# Patient Record
Sex: FEMALE | Race: WHITE | Employment: OTHER | URBAN - METROPOLITAN AREA
[De-identification: names, ages, dates, MRNs, and addresses within clinical notes are randomized per-mention and may not be internally consistent; named-entity substitution may affect disease eponyms.]

---

## 2017-01-01 ENCOUNTER — APPOINTMENT (OUTPATIENT)
Dept: GENERAL RADIOLOGY | Age: 72
DRG: 246 | End: 2017-01-01
Attending: INTERNAL MEDICINE
Payer: MEDICARE

## 2017-01-01 ENCOUNTER — APPOINTMENT (OUTPATIENT)
Dept: GENERAL RADIOLOGY | Age: 72
DRG: 246 | End: 2017-01-01
Attending: EMERGENCY MEDICINE
Payer: MEDICARE

## 2017-01-01 ENCOUNTER — HOSPITAL ENCOUNTER (INPATIENT)
Age: 72
LOS: 9 days | DRG: 246 | End: 2017-01-12
Attending: EMERGENCY MEDICINE | Admitting: INTERNAL MEDICINE
Payer: MEDICARE

## 2017-01-01 VITALS
BODY MASS INDEX: 13.33 KG/M2 | HEART RATE: 77 BPM | DIASTOLIC BLOOD PRESSURE: 60 MMHG | HEIGHT: 59 IN | OXYGEN SATURATION: 94 % | SYSTOLIC BLOOD PRESSURE: 89 MMHG | RESPIRATION RATE: 18 BRPM | WEIGHT: 66.14 LBS | TEMPERATURE: 97.5 F

## 2017-01-01 DIAGNOSIS — I20.0 UNSTABLE ANGINA (HCC): Primary | ICD-10-CM

## 2017-01-01 LAB
ACT BLD: 178 SECS (ref 79–138)
ACT BLD: 219 SECS (ref 79–138)
ACT BLD: 265 SECS (ref 79–138)
ACT BLD: 286 SECS (ref 79–138)
ALBUMIN SERPL BCP-MCNC: 2.5 G/DL (ref 3.5–5)
ALBUMIN SERPL BCP-MCNC: 3.2 G/DL (ref 3.5–5)
ALBUMIN/GLOB SERPL: 0.8 {RATIO} (ref 1.1–2.2)
ALBUMIN/GLOB SERPL: 1.1 {RATIO} (ref 1.1–2.2)
ALP SERPL-CCNC: 50 U/L (ref 45–117)
ALP SERPL-CCNC: 72 U/L (ref 45–117)
ALT SERPL-CCNC: 31 U/L (ref 12–78)
ALT SERPL-CCNC: 40 U/L (ref 12–78)
ANION GAP BLD CALC-SCNC: 10 MMOL/L (ref 5–15)
ANION GAP BLD CALC-SCNC: 10 MMOL/L (ref 5–15)
ANION GAP BLD CALC-SCNC: 11 MMOL/L (ref 5–15)
ANION GAP BLD CALC-SCNC: 13 MMOL/L (ref 5–15)
ANION GAP BLD CALC-SCNC: 7 MMOL/L (ref 5–15)
ANION GAP BLD CALC-SCNC: 7 MMOL/L (ref 5–15)
ANION GAP BLD CALC-SCNC: 8 MMOL/L (ref 5–15)
ANION GAP BLD CALC-SCNC: 9 MMOL/L (ref 5–15)
ANION GAP BLD CALC-SCNC: 9 MMOL/L (ref 5–15)
APTT PPP: 26.6 SEC (ref 22.1–32.5)
APTT PPP: 29.8 SEC (ref 22.1–32.5)
APTT PPP: 67.5 SEC (ref 22.1–32.5)
ARTERIAL PATENCY WRIST A: ABNORMAL
ARTERIAL PATENCY WRIST A: ABNORMAL
ARTERIAL PATENCY WRIST A: YES
AST SERPL W P-5'-P-CCNC: 51 U/L (ref 15–37)
AST SERPL W P-5'-P-CCNC: 56 U/L (ref 15–37)
ATRIAL RATE: 75 BPM
ATRIAL RATE: 80 BPM
ATRIAL RATE: 82 BPM
ATRIAL RATE: 85 BPM
BACTERIA SPEC CULT: NORMAL
BASE DEFICIT BLDA-SCNC: 1 MMOL/L
BASE DEFICIT BLDA-SCNC: 5.1 MMOL/L
BASE DEFICIT BLDA-SCNC: 5.8 MMOL/L
BASE DEFICIT BLDA-SCNC: 7.3 MMOL/L
BASE EXCESS BLDA CALC-SCNC: 2.5 MMOL/L
BASOPHILS # BLD AUTO: 0 K/UL (ref 0–0.1)
BASOPHILS # BLD AUTO: 0.2 K/UL (ref 0–0.1)
BASOPHILS # BLD: 0 % (ref 0–1)
BASOPHILS # BLD: 1 % (ref 0–1)
BDY SITE: ABNORMAL
BILIRUB SERPL-MCNC: 0.4 MG/DL (ref 0.2–1)
BILIRUB SERPL-MCNC: 0.6 MG/DL (ref 0.2–1)
BREATHS.SPONTANEOUS ON VENT: 20
BUN SERPL-MCNC: 10 MG/DL (ref 6–20)
BUN SERPL-MCNC: 10 MG/DL (ref 6–20)
BUN SERPL-MCNC: 12 MG/DL (ref 6–20)
BUN SERPL-MCNC: 14 MG/DL (ref 6–20)
BUN SERPL-MCNC: 15 MG/DL (ref 6–20)
BUN SERPL-MCNC: 16 MG/DL (ref 6–20)
BUN SERPL-MCNC: 17 MG/DL (ref 6–20)
BUN SERPL-MCNC: 7 MG/DL (ref 6–20)
BUN SERPL-MCNC: 9 MG/DL (ref 6–20)
BUN/CREAT SERPL: 12 (ref 12–20)
BUN/CREAT SERPL: 13 (ref 12–20)
BUN/CREAT SERPL: 14 (ref 12–20)
BUN/CREAT SERPL: 14 (ref 12–20)
BUN/CREAT SERPL: 18 (ref 12–20)
BUN/CREAT SERPL: 20 (ref 12–20)
BUN/CREAT SERPL: 22 (ref 12–20)
BUN/CREAT SERPL: 26 (ref 12–20)
BUN/CREAT SERPL: 27 (ref 12–20)
CALCIUM SERPL-MCNC: 7.8 MG/DL (ref 8.5–10.1)
CALCIUM SERPL-MCNC: 7.9 MG/DL (ref 8.5–10.1)
CALCIUM SERPL-MCNC: 8 MG/DL (ref 8.5–10.1)
CALCIUM SERPL-MCNC: 8.5 MG/DL (ref 8.5–10.1)
CALCIUM SERPL-MCNC: 8.6 MG/DL (ref 8.5–10.1)
CALCIUM SERPL-MCNC: 8.9 MG/DL (ref 8.5–10.1)
CALCIUM SERPL-MCNC: 9.2 MG/DL (ref 8.5–10.1)
CALCULATED P AXIS, ECG09: 78 DEGREES
CALCULATED P AXIS, ECG09: 82 DEGREES
CALCULATED P AXIS, ECG09: 83 DEGREES
CALCULATED P AXIS, ECG09: 86 DEGREES
CALCULATED R AXIS, ECG10: -8 DEGREES
CALCULATED R AXIS, ECG10: 27 DEGREES
CALCULATED R AXIS, ECG10: 4 DEGREES
CALCULATED R AXIS, ECG10: 59 DEGREES
CALCULATED T AXIS, ECG11: -16 DEGREES
CALCULATED T AXIS, ECG11: 75 DEGREES
CALCULATED T AXIS, ECG11: 82 DEGREES
CALCULATED T AXIS, ECG11: 89 DEGREES
CHLORIDE SERPL-SCNC: 104 MMOL/L (ref 97–108)
CHLORIDE SERPL-SCNC: 106 MMOL/L (ref 97–108)
CHLORIDE SERPL-SCNC: 106 MMOL/L (ref 97–108)
CHLORIDE SERPL-SCNC: 107 MMOL/L (ref 97–108)
CHLORIDE SERPL-SCNC: 108 MMOL/L (ref 97–108)
CHLORIDE SERPL-SCNC: 109 MMOL/L (ref 97–108)
CHLORIDE SERPL-SCNC: 109 MMOL/L (ref 97–108)
CHLORIDE SERPL-SCNC: 114 MMOL/L (ref 97–108)
CHLORIDE SERPL-SCNC: 114 MMOL/L (ref 97–108)
CK MB CFR SERPL CALC: 11.6 % (ref 0–2.5)
CK MB CFR SERPL CALC: 4.5 % (ref 0–2.5)
CK MB SERPL-MCNC: 18 NG/ML (ref 5–25)
CK MB SERPL-MCNC: 3.4 NG/ML (ref 5–25)
CK SERPL-CCNC: 155 U/L (ref 26–192)
CK SERPL-CCNC: 75 U/L (ref 26–192)
CO2 SERPL-SCNC: 20 MMOL/L (ref 21–32)
CO2 SERPL-SCNC: 23 MMOL/L (ref 21–32)
CO2 SERPL-SCNC: 24 MMOL/L (ref 21–32)
CO2 SERPL-SCNC: 24 MMOL/L (ref 21–32)
CO2 SERPL-SCNC: 25 MMOL/L (ref 21–32)
CO2 SERPL-SCNC: 26 MMOL/L (ref 21–32)
CO2 SERPL-SCNC: 26 MMOL/L (ref 21–32)
CO2 SERPL-SCNC: 27 MMOL/L (ref 21–32)
CO2 SERPL-SCNC: 27 MMOL/L (ref 21–32)
CREAT SERPL-MCNC: 0.56 MG/DL (ref 0.55–1.02)
CREAT SERPL-MCNC: 0.58 MG/DL (ref 0.55–1.02)
CREAT SERPL-MCNC: 0.65 MG/DL (ref 0.55–1.02)
CREAT SERPL-MCNC: 0.69 MG/DL (ref 0.55–1.02)
CREAT SERPL-MCNC: 0.7 MG/DL (ref 0.55–1.02)
CREAT SERPL-MCNC: 0.72 MG/DL (ref 0.55–1.02)
CREAT SERPL-MCNC: 0.79 MG/DL (ref 0.55–1.02)
DIAGNOSIS, 93000: NORMAL
DIFFERENTIAL METHOD BLD: ABNORMAL
EOSINOPHIL # BLD: 0 K/UL (ref 0–0.4)
EOSINOPHIL # BLD: 0.1 K/UL (ref 0–0.4)
EOSINOPHIL NFR BLD: 0 % (ref 0–7)
EOSINOPHIL NFR BLD: 1 % (ref 0–7)
EPAP/CPAP/PEEP, PAPEEP: 5
ERYTHROCYTE [DISTWIDTH] IN BLOOD BY AUTOMATED COUNT: 13.5 % (ref 11.5–14.5)
ERYTHROCYTE [DISTWIDTH] IN BLOOD BY AUTOMATED COUNT: 13.6 % (ref 11.5–14.5)
ERYTHROCYTE [DISTWIDTH] IN BLOOD BY AUTOMATED COUNT: 13.9 % (ref 11.5–14.5)
ERYTHROCYTE [DISTWIDTH] IN BLOOD BY AUTOMATED COUNT: 14 % (ref 11.5–14.5)
FIO2 ON VENT: 100 %
FIO2 ON VENT: 4 %
FIO2 ON VENT: 40 %
FIO2 ON VENT: 40 %
GAS FLOW.O2 O2 DELIVERY SYS: 5 L/MIN
GAS FLOW.O2 SETTING OXYMISER: 14 L/MIN
GAS FLOW.O2 SETTING OXYMISER: 14 L/MIN
GLOBULIN SER CALC-MCNC: 3 G/DL (ref 2–4)
GLOBULIN SER CALC-MCNC: 3.3 G/DL (ref 2–4)
GLUCOSE BLD STRIP.AUTO-MCNC: 134 MG/DL (ref 65–100)
GLUCOSE BLD STRIP.AUTO-MCNC: 148 MG/DL (ref 65–100)
GLUCOSE BLD STRIP.AUTO-MCNC: 75 MG/DL (ref 65–100)
GLUCOSE SERPL-MCNC: 101 MG/DL (ref 65–100)
GLUCOSE SERPL-MCNC: 105 MG/DL (ref 65–100)
GLUCOSE SERPL-MCNC: 114 MG/DL (ref 65–100)
GLUCOSE SERPL-MCNC: 148 MG/DL (ref 65–100)
GLUCOSE SERPL-MCNC: 71 MG/DL (ref 65–100)
GLUCOSE SERPL-MCNC: 89 MG/DL (ref 65–100)
GLUCOSE SERPL-MCNC: 92 MG/DL (ref 65–100)
GLUCOSE SERPL-MCNC: 93 MG/DL (ref 65–100)
GLUCOSE SERPL-MCNC: 96 MG/DL (ref 65–100)
HCO3 BLDA-SCNC: 18 MMOL/L (ref 22–26)
HCO3 BLDA-SCNC: 20 MMOL/L (ref 22–26)
HCO3 BLDA-SCNC: 20 MMOL/L (ref 22–26)
HCO3 BLDA-SCNC: 22 MMOL/L (ref 22–26)
HCO3 BLDA-SCNC: 27 MMOL/L (ref 22–26)
HCT VFR BLD AUTO: 27.7 % (ref 35–47)
HCT VFR BLD AUTO: 31.6 % (ref 35–47)
HCT VFR BLD AUTO: 34.5 % (ref 35–47)
HCT VFR BLD AUTO: 36.5 % (ref 35–47)
HGB BLD-MCNC: 10.5 G/DL (ref 11.5–16)
HGB BLD-MCNC: 11.5 G/DL (ref 11.5–16)
HGB BLD-MCNC: 12.2 G/DL (ref 11.5–16)
HGB BLD-MCNC: 9.3 G/DL (ref 11.5–16)
LYMPHOCYTES # BLD AUTO: 6 % (ref 12–49)
LYMPHOCYTES # BLD AUTO: 6 % (ref 12–49)
LYMPHOCYTES # BLD: 0.8 K/UL (ref 0.8–3.5)
LYMPHOCYTES # BLD: 0.9 K/UL (ref 0.8–3.5)
MAGNESIUM SERPL-MCNC: 2 MG/DL (ref 1.6–2.4)
MAGNESIUM SERPL-MCNC: 2.1 MG/DL (ref 1.6–2.4)
MAGNESIUM SERPL-MCNC: 2.2 MG/DL (ref 1.6–2.4)
MAGNESIUM SERPL-MCNC: 2.3 MG/DL (ref 1.6–2.4)
MAGNESIUM SERPL-MCNC: 2.3 MG/DL (ref 1.6–2.4)
MCH RBC QN AUTO: 31 PG (ref 26–34)
MCH RBC QN AUTO: 31.3 PG (ref 26–34)
MCH RBC QN AUTO: 31.7 PG (ref 26–34)
MCH RBC QN AUTO: 31.8 PG (ref 26–34)
MCHC RBC AUTO-ENTMCNC: 33.2 G/DL (ref 30–36.5)
MCHC RBC AUTO-ENTMCNC: 33.3 G/DL (ref 30–36.5)
MCHC RBC AUTO-ENTMCNC: 33.4 G/DL (ref 30–36.5)
MCHC RBC AUTO-ENTMCNC: 33.6 G/DL (ref 30–36.5)
MCV RBC AUTO: 92.3 FL (ref 80–99)
MCV RBC AUTO: 94.3 FL (ref 80–99)
MCV RBC AUTO: 95 FL (ref 80–99)
MCV RBC AUTO: 95.1 FL (ref 80–99)
MONOCYTES # BLD: 0.3 K/UL (ref 0–1)
MONOCYTES # BLD: 1 K/UL (ref 0–1)
MONOCYTES NFR BLD AUTO: 2 % (ref 5–13)
MONOCYTES NFR BLD AUTO: 8 % (ref 5–13)
NEUTS BAND NFR BLD MANUAL: 6 % (ref 0–6)
NEUTS SEG # BLD: 10.9 K/UL (ref 1.8–8)
NEUTS SEG # BLD: 14.4 K/UL (ref 1.8–8)
NEUTS SEG NFR BLD AUTO: 85 % (ref 32–75)
NEUTS SEG NFR BLD AUTO: 85 % (ref 32–75)
P-R INTERVAL, ECG05: 116 MS
P-R INTERVAL, ECG05: 124 MS
P-R INTERVAL, ECG05: 134 MS
P-R INTERVAL, ECG05: 164 MS
PCO2 BLDA: 34 MMHG (ref 35–45)
PCO2 BLDA: 34 MMHG (ref 35–45)
PCO2 BLDA: 37 MMHG (ref 35–45)
PCO2 BLDA: 39 MMHG (ref 35–45)
PCO2 BLDA: 41 MMHG (ref 35–45)
PH BLDA: 7.31 [PH] (ref 7.35–7.45)
PH BLDA: 7.33 [PH] (ref 7.35–7.45)
PH BLDA: 7.35 [PH] (ref 7.35–7.45)
PH BLDA: 7.44 [PH] (ref 7.35–7.45)
PH BLDA: 7.45 [PH] (ref 7.35–7.45)
PHOSPHATE SERPL-MCNC: 1.1 MG/DL (ref 2.6–4.7)
PHOSPHATE SERPL-MCNC: 2.1 MG/DL (ref 2.6–4.7)
PHOSPHATE SERPL-MCNC: 3 MG/DL (ref 2.6–4.7)
PHOSPHATE SERPL-MCNC: 3.5 MG/DL (ref 2.6–4.7)
PHOSPHATE SERPL-MCNC: 3.7 MG/DL (ref 2.6–4.7)
PLATELET # BLD AUTO: 179 K/UL (ref 150–400)
PLATELET # BLD AUTO: 255 K/UL (ref 150–400)
PLATELET # BLD AUTO: 280 K/UL (ref 150–400)
PLATELET # BLD AUTO: 401 K/UL (ref 150–400)
PO2 BLDA: 105 MMHG (ref 80–100)
PO2 BLDA: 512 MMHG (ref 80–100)
PO2 BLDA: 55 MMHG (ref 80–100)
PO2 BLDA: 70 MMHG (ref 80–100)
PO2 BLDA: 78 MMHG (ref 80–100)
POTASSIUM SERPL-SCNC: 3 MMOL/L (ref 3.5–5.1)
POTASSIUM SERPL-SCNC: 3.2 MMOL/L (ref 3.5–5.1)
POTASSIUM SERPL-SCNC: 3.4 MMOL/L (ref 3.5–5.1)
POTASSIUM SERPL-SCNC: 3.7 MMOL/L (ref 3.5–5.1)
POTASSIUM SERPL-SCNC: 3.8 MMOL/L (ref 3.5–5.1)
POTASSIUM SERPL-SCNC: 3.9 MMOL/L (ref 3.5–5.1)
POTASSIUM SERPL-SCNC: 4.2 MMOL/L (ref 3.5–5.1)
POTASSIUM SERPL-SCNC: 4.2 MMOL/L (ref 3.5–5.1)
POTASSIUM SERPL-SCNC: 4.4 MMOL/L (ref 3.5–5.1)
PRESSURE SUPPORT SETTING VENT: 8 CM[H2O]
PROT SERPL-MCNC: 5.8 G/DL (ref 6.4–8.2)
PROT SERPL-MCNC: 6.2 G/DL (ref 6.4–8.2)
Q-T INTERVAL, ECG07: 356 MS
Q-T INTERVAL, ECG07: 356 MS
Q-T INTERVAL, ECG07: 366 MS
Q-T INTERVAL, ECG07: 380 MS
QRS DURATION, ECG06: 66 MS
QRS DURATION, ECG06: 68 MS
QRS DURATION, ECG06: 68 MS
QRS DURATION, ECG06: 70 MS
QTC CALCULATION (BEZET), ECG08: 410 MS
QTC CALCULATION (BEZET), ECG08: 423 MS
QTC CALCULATION (BEZET), ECG08: 424 MS
QTC CALCULATION (BEZET), ECG08: 427 MS
RBC # BLD AUTO: 3 M/UL (ref 3.8–5.2)
RBC # BLD AUTO: 3.35 M/UL (ref 3.8–5.2)
RBC # BLD AUTO: 3.63 M/UL (ref 3.8–5.2)
RBC # BLD AUTO: 3.84 M/UL (ref 3.8–5.2)
RBC MORPH BLD: ABNORMAL
SAO2 % BLD: 100 % (ref 92–97)
SAO2 % BLD: 87 % (ref 92–97)
SAO2 % BLD: 95 % (ref 92–97)
SAO2 % BLD: 95 % (ref 92–97)
SAO2 % BLD: 98 % (ref 92–97)
SAO2% DEVICE SAO2% SENSOR NAME: ABNORMAL
SERVICE CMNT-IMP: ABNORMAL
SERVICE CMNT-IMP: ABNORMAL
SERVICE CMNT-IMP: NORMAL
SERVICE CMNT-IMP: NORMAL
SODIUM SERPL-SCNC: 138 MMOL/L (ref 136–145)
SODIUM SERPL-SCNC: 140 MMOL/L (ref 136–145)
SODIUM SERPL-SCNC: 140 MMOL/L (ref 136–145)
SODIUM SERPL-SCNC: 142 MMOL/L (ref 136–145)
SODIUM SERPL-SCNC: 143 MMOL/L (ref 136–145)
SODIUM SERPL-SCNC: 144 MMOL/L (ref 136–145)
SODIUM SERPL-SCNC: 145 MMOL/L (ref 136–145)
SODIUM SERPL-SCNC: 145 MMOL/L (ref 136–145)
SODIUM SERPL-SCNC: 146 MMOL/L (ref 136–145)
SPECIMEN SITE: ABNORMAL
THERAPEUTIC RANGE,PTTT: ABNORMAL SECS (ref 58–77)
THERAPEUTIC RANGE,PTTT: NORMAL SECS (ref 58–77)
THERAPEUTIC RANGE,PTTT: NORMAL SECS (ref 58–77)
TROPONIN I SERPL-MCNC: 0.66 NG/ML
TROPONIN I SERPL-MCNC: 1.39 NG/ML
VENTILATION MODE VENT: ABNORMAL
VENTRICULAR RATE, ECG03: 75 BPM
VENTRICULAR RATE, ECG03: 80 BPM
VENTRICULAR RATE, ECG03: 82 BPM
VENTRICULAR RATE, ECG03: 85 BPM
VT SETTING VENT: 400 ML
VT SETTING VENT: 400 ML
WBC # BLD AUTO: 12.8 K/UL (ref 3.6–11)
WBC # BLD AUTO: 12.9 K/UL (ref 3.6–11)
WBC # BLD AUTO: 13.8 K/UL (ref 3.6–11)
WBC # BLD AUTO: 15.8 K/UL (ref 3.6–11)

## 2017-01-01 PROCEDURE — 36415 COLL VENOUS BLD VENIPUNCTURE: CPT | Performed by: INTERNAL MEDICINE

## 2017-01-01 PROCEDURE — 74011000250 HC RX REV CODE- 250: Performed by: INTERNAL MEDICINE

## 2017-01-01 PROCEDURE — 74011000258 HC RX REV CODE- 258: Performed by: INTERNAL MEDICINE

## 2017-01-01 PROCEDURE — 94761 N-INVAS EAR/PLS OXIMETRY MLT: CPT

## 2017-01-01 PROCEDURE — 74011250636 HC RX REV CODE- 250/636: Performed by: INTERNAL MEDICINE

## 2017-01-01 PROCEDURE — 94640 AIRWAY INHALATION TREATMENT: CPT

## 2017-01-01 PROCEDURE — 74011000250 HC RX REV CODE- 250: Performed by: EMERGENCY MEDICINE

## 2017-01-01 PROCEDURE — 94003 VENT MGMT INPAT SUBQ DAY: CPT

## 2017-01-01 PROCEDURE — 71010 XR CHEST PORT: CPT

## 2017-01-01 PROCEDURE — 80048 BASIC METABOLIC PNL TOTAL CA: CPT | Performed by: INTERNAL MEDICINE

## 2017-01-01 PROCEDURE — 31500 INSERT EMERGENCY AIRWAY: CPT

## 2017-01-01 PROCEDURE — 85025 COMPLETE CBC W/AUTO DIFF WBC: CPT | Performed by: EMERGENCY MEDICINE

## 2017-01-01 PROCEDURE — 74011250637 HC RX REV CODE- 250/637: Performed by: INTERNAL MEDICINE

## 2017-01-01 PROCEDURE — 77030029684 HC NEB SM VOL KT MONA -A

## 2017-01-01 PROCEDURE — 82803 BLOOD GASES ANY COMBINATION: CPT | Performed by: INTERNAL MEDICINE

## 2017-01-01 PROCEDURE — C9113 INJ PANTOPRAZOLE SODIUM, VIA: HCPCS | Performed by: INTERNAL MEDICINE

## 2017-01-01 PROCEDURE — 97161 PT EVAL LOW COMPLEX 20 MIN: CPT

## 2017-01-01 PROCEDURE — 87040 BLOOD CULTURE FOR BACTERIA: CPT | Performed by: INTERNAL MEDICINE

## 2017-01-01 PROCEDURE — 5A1935Z RESPIRATORY VENTILATION, LESS THAN 24 CONSECUTIVE HOURS: ICD-10-PCS | Performed by: ANESTHESIOLOGY

## 2017-01-01 PROCEDURE — 36600 WITHDRAWAL OF ARTERIAL BLOOD: CPT

## 2017-01-01 PROCEDURE — 77010033678 HC OXYGEN DAILY

## 2017-01-01 PROCEDURE — 65660000000 HC RM CCU STEPDOWN

## 2017-01-01 PROCEDURE — C1769 GUIDE WIRE: HCPCS

## 2017-01-01 PROCEDURE — C1725 CATH, TRANSLUMIN NON-LASER: HCPCS

## 2017-01-01 PROCEDURE — 97530 THERAPEUTIC ACTIVITIES: CPT

## 2017-01-01 PROCEDURE — 84100 ASSAY OF PHOSPHORUS: CPT | Performed by: INTERNAL MEDICINE

## 2017-01-01 PROCEDURE — 65620000000 HC RM CCU GENERAL

## 2017-01-01 PROCEDURE — 74011000250 HC RX REV CODE- 250

## 2017-01-01 PROCEDURE — 99285 EMERGENCY DEPT VISIT HI MDM: CPT

## 2017-01-01 PROCEDURE — 36600 WITHDRAWAL OF ARTERIAL BLOOD: CPT | Performed by: INTERNAL MEDICINE

## 2017-01-01 PROCEDURE — 027036Z DILATION OF CORONARY ARTERY, ONE ARTERY WITH THREE DRUG-ELUTING INTRALUMINAL DEVICES, PERCUTANEOUS APPROACH: ICD-10-PCS | Performed by: INTERNAL MEDICINE

## 2017-01-01 PROCEDURE — 97166 OT EVAL MOD COMPLEX 45 MIN: CPT

## 2017-01-01 PROCEDURE — 85027 COMPLETE CBC AUTOMATED: CPT | Performed by: INTERNAL MEDICINE

## 2017-01-01 PROCEDURE — C1894 INTRO/SHEATH, NON-LASER: HCPCS

## 2017-01-01 PROCEDURE — 77030002996 HC SUT SLK J&J -A

## 2017-01-01 PROCEDURE — 77030013140 HC MSK NEB VYRM -A

## 2017-01-01 PROCEDURE — 0BH17EZ INSERTION OF ENDOTRACHEAL AIRWAY INTO TRACHEA, VIA NATURAL OR ARTIFICIAL OPENING: ICD-10-PCS | Performed by: ANESTHESIOLOGY

## 2017-01-01 PROCEDURE — 74011636320 HC RX REV CODE- 636/320

## 2017-01-01 PROCEDURE — 74011250636 HC RX REV CODE- 250/636: Performed by: EMERGENCY MEDICINE

## 2017-01-01 PROCEDURE — 71020 XR CHEST PA LAT: CPT

## 2017-01-01 PROCEDURE — 83735 ASSAY OF MAGNESIUM: CPT | Performed by: INTERNAL MEDICINE

## 2017-01-01 PROCEDURE — 82962 GLUCOSE BLOOD TEST: CPT

## 2017-01-01 PROCEDURE — 76060000036 HC ANESTHESIA 2.5 TO 3 HR

## 2017-01-01 PROCEDURE — 02C03ZZ EXTIRPATION OF MATTER FROM CORONARY ARTERY, ONE ARTERY, PERCUTANEOUS APPROACH: ICD-10-PCS | Performed by: INTERNAL MEDICINE

## 2017-01-01 PROCEDURE — C1874 STENT, COATED/COV W/DEL SYS: HCPCS

## 2017-01-01 PROCEDURE — 97535 SELF CARE MNGMENT TRAINING: CPT

## 2017-01-01 PROCEDURE — G8978 MOBILITY CURRENT STATUS: HCPCS

## 2017-01-01 PROCEDURE — 77030034850

## 2017-01-01 PROCEDURE — 77030019697 HC SYR ANGI INFL MRTM -B

## 2017-01-01 PROCEDURE — C1887 CATHETER, GUIDING: HCPCS

## 2017-01-01 PROCEDURE — 74011250636 HC RX REV CODE- 250/636

## 2017-01-01 PROCEDURE — 85730 THROMBOPLASTIN TIME PARTIAL: CPT | Performed by: EMERGENCY MEDICINE

## 2017-01-01 PROCEDURE — B2151ZZ FLUOROSCOPY OF LEFT HEART USING LOW OSMOLAR CONTRAST: ICD-10-PCS | Performed by: INTERNAL MEDICINE

## 2017-01-01 PROCEDURE — 85347 COAGULATION TIME ACTIVATED: CPT

## 2017-01-01 PROCEDURE — 5A1223Z PERFORMANCE OF CARDIAC PACING, CONTINUOUS: ICD-10-PCS | Performed by: INTERNAL MEDICINE

## 2017-01-01 PROCEDURE — 82550 ASSAY OF CK (CPK): CPT | Performed by: EMERGENCY MEDICINE

## 2017-01-01 PROCEDURE — 93005 ELECTROCARDIOGRAM TRACING: CPT

## 2017-01-01 PROCEDURE — 74011636320 HC RX REV CODE- 636/320: Performed by: INTERNAL MEDICINE

## 2017-01-01 PROCEDURE — G8979 MOBILITY GOAL STATUS: HCPCS

## 2017-01-01 PROCEDURE — C1724 CATH, TRANS ATHEREC,ROTATION: HCPCS

## 2017-01-01 PROCEDURE — 77030018729 HC ELECTRD DEFIB PAD CARD -B

## 2017-01-01 PROCEDURE — 80053 COMPREHEN METABOLIC PANEL: CPT | Performed by: INTERNAL MEDICINE

## 2017-01-01 PROCEDURE — 80053 COMPREHEN METABOLIC PANEL: CPT | Performed by: EMERGENCY MEDICINE

## 2017-01-01 PROCEDURE — 97116 GAIT TRAINING THERAPY: CPT

## 2017-01-01 PROCEDURE — 82550 ASSAY OF CK (CPK): CPT | Performed by: INTERNAL MEDICINE

## 2017-01-01 PROCEDURE — 93458 L HRT ARTERY/VENTRICLE ANGIO: CPT

## 2017-01-01 PROCEDURE — 5A12012 PERFORMANCE OF CARDIAC OUTPUT, SINGLE, MANUAL: ICD-10-PCS | Performed by: ANESTHESIOLOGY

## 2017-01-01 PROCEDURE — 85025 COMPLETE CBC W/AUTO DIFF WBC: CPT | Performed by: INTERNAL MEDICINE

## 2017-01-01 PROCEDURE — 84484 ASSAY OF TROPONIN QUANT: CPT | Performed by: EMERGENCY MEDICINE

## 2017-01-01 PROCEDURE — 77030022472 HC CATH ANGI GDLNR VASC -E

## 2017-01-01 PROCEDURE — 77030005320 HC CATH PACE TEMP STJU -B

## 2017-01-01 PROCEDURE — 77030022017 HC DRSG HEMO QCLOT ZMED -A

## 2017-01-01 PROCEDURE — G8988 SELF CARE GOAL STATUS: HCPCS

## 2017-01-01 PROCEDURE — 94002 VENT MGMT INPAT INIT DAY: CPT

## 2017-01-01 PROCEDURE — G8987 SELF CARE CURRENT STATUS: HCPCS

## 2017-01-01 PROCEDURE — 36415 COLL VENOUS BLD VENIPUNCTURE: CPT | Performed by: EMERGENCY MEDICINE

## 2017-01-01 PROCEDURE — 4A023N7 MEASUREMENT OF CARDIAC SAMPLING AND PRESSURE, LEFT HEART, PERCUTANEOUS APPROACH: ICD-10-PCS | Performed by: INTERNAL MEDICINE

## 2017-01-01 PROCEDURE — 85730 THROMBOPLASTIN TIME PARTIAL: CPT | Performed by: INTERNAL MEDICINE

## 2017-01-01 PROCEDURE — 77030013797 HC KT TRNSDUC PRSSR EDWD -A

## 2017-01-01 PROCEDURE — B2111ZZ FLUOROSCOPY OF MULTIPLE CORONARY ARTERIES USING LOW OSMOLAR CONTRAST: ICD-10-PCS | Performed by: INTERNAL MEDICINE

## 2017-01-01 PROCEDURE — 84484 ASSAY OF TROPONIN QUANT: CPT | Performed by: INTERNAL MEDICINE

## 2017-01-01 PROCEDURE — 83735 ASSAY OF MAGNESIUM: CPT | Performed by: EMERGENCY MEDICINE

## 2017-01-01 RX ORDER — POTASSIUM CHLORIDE 1.5 G/1.77G
40 POWDER, FOR SOLUTION ORAL ONCE
Status: COMPLETED | OUTPATIENT
Start: 2017-01-01 | End: 2017-01-01

## 2017-01-01 RX ORDER — ALPRAZOLAM 0.5 MG/1
1 TABLET ORAL
Status: DISCONTINUED | OUTPATIENT
Start: 2017-01-01 | End: 2017-01-01

## 2017-01-01 RX ORDER — CLOPIDOGREL 300 MG/1
600 TABLET, FILM COATED ORAL ONCE
Status: DISCONTINUED | OUTPATIENT
Start: 2017-01-01 | End: 2017-01-01 | Stop reason: HOSPADM

## 2017-01-01 RX ORDER — ACETAMINOPHEN 500 MG
1000 TABLET ORAL
Status: ACTIVE | OUTPATIENT
Start: 2017-01-01 | End: 2017-01-01

## 2017-01-01 RX ORDER — HEPARIN SODIUM 200 [USP'U]/100ML
500 INJECTION, SOLUTION INTRAVENOUS ONCE
Status: COMPLETED | OUTPATIENT
Start: 2017-01-01 | End: 2017-01-01

## 2017-01-01 RX ORDER — IPRATROPIUM BROMIDE AND ALBUTEROL SULFATE 2.5; .5 MG/3ML; MG/3ML
3 SOLUTION RESPIRATORY (INHALATION)
Status: DISCONTINUED | OUTPATIENT
Start: 2017-01-01 | End: 2017-01-01 | Stop reason: HOSPADM

## 2017-01-01 RX ORDER — PANTOPRAZOLE SODIUM 40 MG/1
40 TABLET, DELAYED RELEASE ORAL
Status: DISCONTINUED | OUTPATIENT
Start: 2017-01-01 | End: 2017-01-01 | Stop reason: HOSPADM

## 2017-01-01 RX ORDER — HEPARIN SODIUM 10000 [USP'U]/100ML
12-25 INJECTION, SOLUTION INTRAVENOUS
Status: DISCONTINUED | OUTPATIENT
Start: 2017-01-01 | End: 2017-01-01

## 2017-01-01 RX ORDER — SODIUM CHLORIDE 0.9 % (FLUSH) 0.9 %
5-10 SYRINGE (ML) INJECTION AS NEEDED
Status: DISCONTINUED | OUTPATIENT
Start: 2017-01-01 | End: 2017-01-01 | Stop reason: HOSPADM

## 2017-01-01 RX ORDER — PHENYLEPHRINE HYDROCHLORIDE 10 MG/ML
INJECTION INTRAVENOUS
Status: DISCONTINUED
Start: 2017-01-01 | End: 2017-01-01 | Stop reason: ALTCHOICE

## 2017-01-01 RX ORDER — VERAPAMIL HYDROCHLORIDE 2.5 MG/ML
INJECTION, SOLUTION INTRAVENOUS
Status: DISCONTINUED
Start: 2017-01-01 | End: 2017-01-01 | Stop reason: ALTCHOICE

## 2017-01-01 RX ORDER — METOPROLOL TARTRATE 25 MG/1
12.5 TABLET, FILM COATED ORAL EVERY 12 HOURS
Status: DISCONTINUED | OUTPATIENT
Start: 2017-01-01 | End: 2017-01-01 | Stop reason: HOSPADM

## 2017-01-01 RX ORDER — POTASSIUM CHLORIDE 20 MEQ/1
40 TABLET, EXTENDED RELEASE ORAL
Status: COMPLETED | OUTPATIENT
Start: 2017-01-01 | End: 2017-01-01

## 2017-01-01 RX ORDER — ACETAMINOPHEN 10 MG/ML
1000 INJECTION, SOLUTION INTRAVENOUS
Status: DISCONTINUED | OUTPATIENT
Start: 2017-01-01 | End: 2017-01-01

## 2017-01-01 RX ORDER — CLOPIDOGREL 300 MG/1
600 TABLET, FILM COATED ORAL ONCE
Status: COMPLETED | OUTPATIENT
Start: 2017-01-01 | End: 2017-01-01

## 2017-01-01 RX ORDER — IPRATROPIUM BROMIDE 0.5 MG/2.5ML
0.5 SOLUTION RESPIRATORY (INHALATION)
Status: DISCONTINUED | OUTPATIENT
Start: 2017-01-01 | End: 2017-01-01

## 2017-01-01 RX ORDER — ALBUTEROL SULFATE 0.83 MG/ML
2.5 SOLUTION RESPIRATORY (INHALATION)
Status: DISCONTINUED | OUTPATIENT
Start: 2017-01-01 | End: 2017-01-01

## 2017-01-01 RX ORDER — LORAZEPAM 1 MG/1
1 TABLET ORAL
Status: DISCONTINUED | OUTPATIENT
Start: 2017-01-01 | End: 2017-01-01

## 2017-01-01 RX ORDER — HEPARIN SODIUM 5000 [USP'U]/ML
60 INJECTION, SOLUTION INTRAVENOUS; SUBCUTANEOUS AS NEEDED
Status: DISCONTINUED | OUTPATIENT
Start: 2017-01-01 | End: 2017-01-01 | Stop reason: ALTCHOICE

## 2017-01-01 RX ORDER — PRAVASTATIN SODIUM 40 MG/1
40 TABLET ORAL
Status: DISCONTINUED | OUTPATIENT
Start: 2017-01-01 | End: 2017-01-01

## 2017-01-01 RX ORDER — NITROGLYCERIN 0.4 MG/1
0.4 TABLET SUBLINGUAL AS NEEDED
Status: DISCONTINUED | OUTPATIENT
Start: 2017-01-01 | End: 2017-01-01 | Stop reason: HOSPADM

## 2017-01-01 RX ORDER — SODIUM CHLORIDE 9 MG/ML
INJECTION, SOLUTION INTRAVENOUS
Status: DISCONTINUED
Start: 2017-01-01 | End: 2017-01-01 | Stop reason: ALTCHOICE

## 2017-01-01 RX ORDER — ATROPINE SULFATE 0.1 MG/ML
INJECTION INTRAVENOUS
Status: COMPLETED | OUTPATIENT
Start: 2017-01-01 | End: 2017-01-01

## 2017-01-01 RX ORDER — HEPARIN SODIUM 200 [USP'U]/100ML
INJECTION, SOLUTION INTRAVENOUS
Status: DISCONTINUED
Start: 2017-01-01 | End: 2017-01-01 | Stop reason: HOSPADM

## 2017-01-01 RX ORDER — CLOPIDOGREL BISULFATE 75 MG/1
75 TABLET ORAL DAILY
Status: DISCONTINUED | OUTPATIENT
Start: 2017-01-01 | End: 2017-01-01 | Stop reason: HOSPADM

## 2017-01-01 RX ORDER — PROPOFOL 10 MG/ML
5-50 VIAL (ML) INTRAVENOUS
Status: DISCONTINUED | OUTPATIENT
Start: 2017-01-01 | End: 2017-01-01

## 2017-01-01 RX ORDER — POTASSIUM CHLORIDE 750 MG/1
20 TABLET, FILM COATED, EXTENDED RELEASE ORAL DAILY
Status: DISCONTINUED | OUTPATIENT
Start: 2017-01-01 | End: 2017-01-01 | Stop reason: HOSPADM

## 2017-01-01 RX ORDER — IPRATROPIUM BROMIDE AND ALBUTEROL SULFATE 2.5; .5 MG/3ML; MG/3ML
3 SOLUTION RESPIRATORY (INHALATION)
Status: DISCONTINUED | OUTPATIENT
Start: 2017-01-01 | End: 2017-01-01

## 2017-01-01 RX ORDER — ACETAMINOPHEN 10 MG/ML
1000 INJECTION, SOLUTION INTRAVENOUS
Status: DISPENSED | OUTPATIENT
Start: 2017-01-01 | End: 2017-01-01

## 2017-01-01 RX ORDER — LIDOCAINE HYDROCHLORIDE 10 MG/ML
INJECTION INFILTRATION; PERINEURAL
Status: COMPLETED
Start: 2017-01-01 | End: 2017-01-01

## 2017-01-01 RX ORDER — ALBUTEROL SULFATE 90 UG/1
2 AEROSOL, METERED RESPIRATORY (INHALATION)
Status: DISCONTINUED | OUTPATIENT
Start: 2017-01-01 | End: 2017-01-01 | Stop reason: SDUPTHER

## 2017-01-01 RX ORDER — LEVOTHYROXINE SODIUM 50 UG/1
50 TABLET ORAL
COMMUNITY

## 2017-01-01 RX ORDER — HEPARIN SODIUM 1000 [USP'U]/ML
INJECTION, SOLUTION INTRAVENOUS; SUBCUTANEOUS
Status: COMPLETED
Start: 2017-01-01 | End: 2017-01-01

## 2017-01-01 RX ORDER — ALBUTEROL SULFATE 0.83 MG/ML
5 SOLUTION RESPIRATORY (INHALATION)
Status: COMPLETED | OUTPATIENT
Start: 2017-01-01 | End: 2017-01-01

## 2017-01-01 RX ORDER — IPRATROPIUM BROMIDE 0.5 MG/2.5ML
0.5 SOLUTION RESPIRATORY (INHALATION)
Status: COMPLETED | OUTPATIENT
Start: 2017-01-01 | End: 2017-01-01

## 2017-01-01 RX ORDER — CHLORHEXIDINE GLUCONATE 1.2 MG/ML
15 RINSE ORAL EVERY 12 HOURS
Status: DISCONTINUED | OUTPATIENT
Start: 2017-01-01 | End: 2017-01-01

## 2017-01-01 RX ORDER — HEPARIN SODIUM 5000 [USP'U]/ML
INJECTION, SOLUTION INTRAVENOUS; SUBCUTANEOUS
Status: DISCONTINUED
Start: 2017-01-01 | End: 2017-01-01 | Stop reason: HOSPADM

## 2017-01-01 RX ORDER — FLUTICASONE FUROATE AND VILANTEROL 100; 25 UG/1; UG/1
1 POWDER RESPIRATORY (INHALATION) DAILY
Status: DISCONTINUED | OUTPATIENT
Start: 2017-01-01 | End: 2017-01-01 | Stop reason: HOSPADM

## 2017-01-01 RX ORDER — GUAIFENESIN 100 MG/5ML
81 LIQUID (ML) ORAL DAILY
Status: DISCONTINUED | OUTPATIENT
Start: 2017-01-01 | End: 2017-01-01 | Stop reason: HOSPADM

## 2017-01-01 RX ORDER — LEVOTHYROXINE SODIUM 75 UG/1
75 TABLET ORAL DAILY
Status: DISCONTINUED | OUTPATIENT
Start: 2017-01-01 | End: 2017-01-01 | Stop reason: SDUPTHER

## 2017-01-01 RX ORDER — FLUTICASONE PROPIONATE 50 MCG
2 SPRAY, SUSPENSION (ML) NASAL DAILY
Status: DISCONTINUED | OUTPATIENT
Start: 2017-01-01 | End: 2017-01-01 | Stop reason: HOSPADM

## 2017-01-01 RX ORDER — HEPARIN SODIUM 200 [USP'U]/100ML
INJECTION, SOLUTION INTRAVENOUS
Status: COMPLETED
Start: 2017-01-01 | End: 2017-01-01

## 2017-01-01 RX ORDER — LEVOTHYROXINE SODIUM 50 UG/1
50 TABLET ORAL
Status: DISCONTINUED | OUTPATIENT
Start: 2017-01-01 | End: 2017-01-01 | Stop reason: HOSPADM

## 2017-01-01 RX ORDER — LIDOCAINE HYDROCHLORIDE 10 MG/ML
INJECTION INFILTRATION; PERINEURAL
Status: DISCONTINUED
Start: 2017-01-01 | End: 2017-01-01 | Stop reason: HOSPADM

## 2017-01-01 RX ORDER — MIDAZOLAM HYDROCHLORIDE 1 MG/ML
INJECTION, SOLUTION INTRAMUSCULAR; INTRAVENOUS
Status: COMPLETED
Start: 2017-01-01 | End: 2017-01-01

## 2017-01-01 RX ORDER — VARENICLINE TARTRATE 0.5 MG/1
0.5 TABLET, FILM COATED ORAL
COMMUNITY

## 2017-01-01 RX ORDER — PHENYLEPHRINE HYDROCHLORIDE 10 MG/ML
100-200 INJECTION INTRAVENOUS
Status: DISCONTINUED | OUTPATIENT
Start: 2017-01-01 | End: 2017-01-01 | Stop reason: ALTCHOICE

## 2017-01-01 RX ORDER — ALPRAZOLAM 0.25 MG/1
0.25 TABLET ORAL
Status: DISCONTINUED | OUTPATIENT
Start: 2017-01-01 | End: 2017-01-01 | Stop reason: HOSPADM

## 2017-01-01 RX ORDER — FENTANYL CITRATE 50 UG/ML
INJECTION, SOLUTION INTRAMUSCULAR; INTRAVENOUS
Status: COMPLETED
Start: 2017-01-01 | End: 2017-01-01

## 2017-01-01 RX ORDER — AMOXICILLIN 250 MG
1 CAPSULE ORAL DAILY PRN
Status: DISCONTINUED | OUTPATIENT
Start: 2017-01-01 | End: 2017-01-01 | Stop reason: HOSPADM

## 2017-01-01 RX ORDER — AMOXICILLIN AND CLAVULANATE POTASSIUM 875; 125 MG/1; MG/1
1 TABLET, FILM COATED ORAL EVERY 12 HOURS
Status: DISCONTINUED | OUTPATIENT
Start: 2017-01-01 | End: 2017-01-01

## 2017-01-01 RX ORDER — SODIUM CHLORIDE 9 MG/ML
100 INJECTION, SOLUTION INTRAVENOUS CONTINUOUS
Status: DISCONTINUED | OUTPATIENT
Start: 2017-01-01 | End: 2017-01-01

## 2017-01-01 RX ORDER — HEPARIN SODIUM 5000 [USP'U]/ML
30 INJECTION, SOLUTION INTRAVENOUS; SUBCUTANEOUS AS NEEDED
Status: DISCONTINUED | OUTPATIENT
Start: 2017-01-01 | End: 2017-01-01 | Stop reason: ALTCHOICE

## 2017-01-01 RX ORDER — VALSARTAN 40 MG/1
20 TABLET ORAL DAILY
Status: DISCONTINUED | OUTPATIENT
Start: 2017-01-01 | End: 2017-01-01 | Stop reason: HOSPADM

## 2017-01-01 RX ORDER — ASPIRIN 81 MG/1
81 TABLET ORAL DAILY
Status: DISCONTINUED | OUTPATIENT
Start: 2017-01-01 | End: 2017-01-01

## 2017-01-01 RX ORDER — HEPARIN SODIUM 5000 [USP'U]/ML
5000 INJECTION, SOLUTION INTRAVENOUS; SUBCUTANEOUS ONCE
Status: COMPLETED | OUTPATIENT
Start: 2017-01-01 | End: 2017-01-01

## 2017-01-01 RX ORDER — AMOXICILLIN AND CLAVULANATE POTASSIUM 500; 125 MG/1; MG/1
1 TABLET, FILM COATED ORAL EVERY 12 HOURS
Status: DISCONTINUED | OUTPATIENT
Start: 2017-01-01 | End: 2017-01-01 | Stop reason: HOSPADM

## 2017-01-01 RX ORDER — LEVOTHYROXINE SODIUM 75 UG/1
75 TABLET ORAL
Status: DISCONTINUED | OUTPATIENT
Start: 2017-01-01 | End: 2017-01-01 | Stop reason: HOSPADM

## 2017-01-01 RX ORDER — LISINOPRIL 5 MG/1
5 TABLET ORAL DAILY
Status: DISCONTINUED | OUTPATIENT
Start: 2017-01-01 | End: 2017-01-01

## 2017-01-01 RX ORDER — GUAIFENESIN 100 MG/5ML
100 SOLUTION ORAL 4 TIMES DAILY
Status: DISCONTINUED | OUTPATIENT
Start: 2017-01-01 | End: 2017-01-01 | Stop reason: HOSPADM

## 2017-01-01 RX ORDER — SUCCINYLCHOLINE CHLORIDE 20 MG/ML
INJECTION INTRAMUSCULAR; INTRAVENOUS
Status: DISCONTINUED
Start: 2017-01-01 | End: 2017-01-01 | Stop reason: ALTCHOICE

## 2017-01-01 RX ORDER — TRIAMCINOLONE ACETONIDE 55 UG/1
2 SPRAY, METERED NASAL DAILY
Status: DISCONTINUED | OUTPATIENT
Start: 2017-01-01 | End: 2017-01-01 | Stop reason: CLARIF

## 2017-01-01 RX ORDER — FENTANYL CITRATE 50 UG/ML
25-50 INJECTION, SOLUTION INTRAMUSCULAR; INTRAVENOUS
Status: DISCONTINUED | OUTPATIENT
Start: 2017-01-01 | End: 2017-01-01 | Stop reason: ALTCHOICE

## 2017-01-01 RX ORDER — MIDAZOLAM HYDROCHLORIDE 1 MG/ML
.5-2 INJECTION, SOLUTION INTRAMUSCULAR; INTRAVENOUS
Status: DISCONTINUED | OUTPATIENT
Start: 2017-01-01 | End: 2017-01-01 | Stop reason: ALTCHOICE

## 2017-01-01 RX ORDER — LORAZEPAM 2 MG/ML
1 INJECTION INTRAMUSCULAR ONCE
Status: DISCONTINUED | OUTPATIENT
Start: 2017-01-01 | End: 2017-01-01 | Stop reason: HOSPADM

## 2017-01-01 RX ORDER — LEVOTHYROXINE SODIUM 75 UG/1
75 TABLET ORAL
COMMUNITY

## 2017-01-01 RX ORDER — SODIUM CHLORIDE 0.9 % (FLUSH) 0.9 %
5-10 SYRINGE (ML) INJECTION EVERY 8 HOURS
Status: DISCONTINUED | OUTPATIENT
Start: 2017-01-01 | End: 2017-01-01 | Stop reason: HOSPADM

## 2017-01-01 RX ORDER — HEPARIN SODIUM 1000 [USP'U]/ML
1000-10000 INJECTION, SOLUTION INTRAVENOUS; SUBCUTANEOUS
Status: DISCONTINUED | OUTPATIENT
Start: 2017-01-01 | End: 2017-01-01 | Stop reason: ALTCHOICE

## 2017-01-01 RX ORDER — LIDOCAINE HYDROCHLORIDE 10 MG/ML
1-20 INJECTION INFILTRATION; PERINEURAL
Status: DISCONTINUED | OUTPATIENT
Start: 2017-01-01 | End: 2017-01-01 | Stop reason: ALTCHOICE

## 2017-01-01 RX ORDER — BUMETANIDE 0.25 MG/ML
1 INJECTION INTRAMUSCULAR; INTRAVENOUS ONCE
Status: COMPLETED | OUTPATIENT
Start: 2017-01-01 | End: 2017-01-01

## 2017-01-01 RX ORDER — SODIUM CHLORIDE 9 MG/ML
50 INJECTION, SOLUTION INTRAVENOUS CONTINUOUS
Status: DISCONTINUED | OUTPATIENT
Start: 2017-01-01 | End: 2017-01-01

## 2017-01-01 RX ORDER — SODIUM BICARBONATE 1 MEQ/ML
SYRINGE (ML) INTRAVENOUS
Status: COMPLETED | OUTPATIENT
Start: 2017-01-01 | End: 2017-01-01

## 2017-01-01 RX ORDER — METOPROLOL TARTRATE 25 MG/1
25 TABLET, FILM COATED ORAL EVERY 12 HOURS
Status: DISCONTINUED | OUTPATIENT
Start: 2017-01-01 | End: 2017-01-01

## 2017-01-01 RX ORDER — HEPARIN SODIUM 1000 [USP'U]/ML
INJECTION, SOLUTION INTRAVENOUS; SUBCUTANEOUS
Status: DISCONTINUED
Start: 2017-01-01 | End: 2017-01-01 | Stop reason: ALTCHOICE

## 2017-01-01 RX ORDER — DOPAMINE HYDROCHLORIDE 320 MG/100ML
INJECTION, SOLUTION INTRAVENOUS
Status: COMPLETED | OUTPATIENT
Start: 2017-01-01 | End: 2017-01-01

## 2017-01-01 RX ORDER — ATORVASTATIN CALCIUM 40 MG/1
40 TABLET, FILM COATED ORAL
Status: DISCONTINUED | OUTPATIENT
Start: 2017-01-01 | End: 2017-01-01

## 2017-01-01 RX ORDER — ACETAMINOPHEN 325 MG/1
650 TABLET ORAL
Status: DISCONTINUED | OUTPATIENT
Start: 2017-01-01 | End: 2017-01-01 | Stop reason: HOSPADM

## 2017-01-01 RX ORDER — EPTIFIBATIDE 0.75 MG/ML
1 INJECTION, SOLUTION INTRAVENOUS CONTINUOUS
Status: DISCONTINUED | OUTPATIENT
Start: 2017-01-01 | End: 2017-01-01

## 2017-01-01 RX ORDER — PRAVASTATIN SODIUM 40 MG/1
40 TABLET ORAL
COMMUNITY

## 2017-01-01 RX ORDER — HEPARIN SODIUM 1000 [USP'U]/ML
INJECTION, SOLUTION INTRAVENOUS; SUBCUTANEOUS
Status: DISCONTINUED
Start: 2017-01-01 | End: 2017-01-01 | Stop reason: HOSPADM

## 2017-01-01 RX ORDER — EPINEPHRINE 0.1 MG/ML
INJECTION INTRACARDIAC; INTRAVENOUS
Status: COMPLETED | OUTPATIENT
Start: 2017-01-01 | End: 2017-01-01

## 2017-01-01 RX ORDER — ATORVASTATIN CALCIUM 40 MG/1
40 TABLET, FILM COATED ORAL
Status: DISCONTINUED | OUTPATIENT
Start: 2017-01-01 | End: 2017-01-01 | Stop reason: HOSPADM

## 2017-01-01 RX ORDER — EPTIFIBATIDE 0.75 MG/ML
INJECTION, SOLUTION INTRAVENOUS
Status: COMPLETED
Start: 2017-01-01 | End: 2017-01-01

## 2017-01-01 RX ORDER — MUPIROCIN 20 MG/G
OINTMENT TOPICAL 2 TIMES DAILY
Status: COMPLETED | OUTPATIENT
Start: 2017-01-01 | End: 2017-01-01

## 2017-01-01 RX ADMIN — IPRATROPIUM BROMIDE AND ALBUTEROL SULFATE 3 ML: .5; 3 SOLUTION RESPIRATORY (INHALATION) at 21:58

## 2017-01-01 RX ADMIN — LEVOTHYROXINE SODIUM 75 MCG: 75 TABLET ORAL at 06:14

## 2017-01-01 RX ADMIN — UMECLIDINIUM 1 PUFF: 62.5 AEROSOL, POWDER ORAL at 18:09

## 2017-01-01 RX ADMIN — IPRATROPIUM BROMIDE AND ALBUTEROL SULFATE 3 ML: .5; 3 SOLUTION RESPIRATORY (INHALATION) at 23:08

## 2017-01-01 RX ADMIN — HEPARIN SODIUM 2000 UNITS: 1000 INJECTION, SOLUTION INTRAVENOUS; SUBCUTANEOUS at 17:38

## 2017-01-01 RX ADMIN — METOPROLOL TARTRATE 12.5 MG: 25 TABLET ORAL at 06:25

## 2017-01-01 RX ADMIN — GUAIFENESIN 100 MG: 100 SOLUTION ORAL at 13:28

## 2017-01-01 RX ADMIN — CLOPIDOGREL BISULFATE 75 MG: 75 TABLET ORAL at 09:22

## 2017-01-01 RX ADMIN — AMOXICILLIN AND CLAVULANATE POTASSIUM 1 TABLET: 500; 125 TABLET, FILM COATED ORAL at 09:00

## 2017-01-01 RX ADMIN — NITROGLYCERIN 1 INCH: 20 OINTMENT TOPICAL at 13:14

## 2017-01-01 RX ADMIN — HEPARIN SODIUM 1000 UNITS: 200 INJECTION, SOLUTION INTRAVENOUS at 15:17

## 2017-01-01 RX ADMIN — POTASSIUM CHLORIDE 20 MEQ: 750 TABLET, FILM COATED, EXTENDED RELEASE ORAL at 09:59

## 2017-01-01 RX ADMIN — SODIUM CHLORIDE 50 ML/HR: 900 INJECTION, SOLUTION INTRAVENOUS at 12:18

## 2017-01-01 RX ADMIN — ASPIRIN 81 MG 81 MG: 81 TABLET ORAL at 10:18

## 2017-01-01 RX ADMIN — Medication 10 ML: at 05:38

## 2017-01-01 RX ADMIN — IOPAMIDOL 70 ML: 755 INJECTION, SOLUTION INTRAVENOUS at 17:43

## 2017-01-01 RX ADMIN — METOPROLOL TARTRATE 12.5 MG: 25 TABLET ORAL at 18:08

## 2017-01-01 RX ADMIN — IPRATROPIUM BROMIDE AND ALBUTEROL SULFATE 3 ML: .5; 3 SOLUTION RESPIRATORY (INHALATION) at 02:57

## 2017-01-01 RX ADMIN — PANTOPRAZOLE SODIUM 40 MG: 40 TABLET, DELAYED RELEASE ORAL at 08:36

## 2017-01-01 RX ADMIN — LEVOTHYROXINE SODIUM 75 MCG: 75 TABLET ORAL at 09:19

## 2017-01-01 RX ADMIN — GUAIFENESIN 100 MG: 100 SOLUTION ORAL at 23:04

## 2017-01-01 RX ADMIN — GUAIFENESIN 100 MG: 100 SOLUTION ORAL at 17:55

## 2017-01-01 RX ADMIN — DOCUSATE SODIUM AND SENNOSIDES 1 TABLET: 8.6; 5 TABLET, FILM COATED ORAL at 09:51

## 2017-01-01 RX ADMIN — Medication 10 ML: at 13:49

## 2017-01-01 RX ADMIN — PIPERACILLIN SODIUM,TAZOBACTAM SODIUM 2.25 G: 2; .25 INJECTION, POWDER, FOR SOLUTION INTRAVENOUS at 14:48

## 2017-01-01 RX ADMIN — HEPARIN SODIUM 5000 UNITS: 5000 INJECTION, SOLUTION INTRAVENOUS; SUBCUTANEOUS at 06:01

## 2017-01-01 RX ADMIN — VALSARTAN 20 MG: 40 TABLET, FILM COATED ORAL at 13:28

## 2017-01-01 RX ADMIN — Medication 10 ML: at 21:07

## 2017-01-01 RX ADMIN — PIPERACILLIN SODIUM,TAZOBACTAM SODIUM 2.25 G: 2; .25 INJECTION, POWDER, FOR SOLUTION INTRAVENOUS at 00:09

## 2017-01-01 RX ADMIN — FLUTICASONE PROPIONATE 2 SPRAY: 50 SPRAY, METERED NASAL at 09:19

## 2017-01-01 RX ADMIN — CHLORHEXIDINE GLUCONATE 15 ML: 1.2 RINSE ORAL at 22:50

## 2017-01-01 RX ADMIN — PIPERACILLIN SODIUM,TAZOBACTAM SODIUM 2.25 G: 2; .25 INJECTION, POWDER, FOR SOLUTION INTRAVENOUS at 17:29

## 2017-01-01 RX ADMIN — IPRATROPIUM BROMIDE AND ALBUTEROL SULFATE 3 ML: .5; 3 SOLUTION RESPIRATORY (INHALATION) at 06:18

## 2017-01-01 RX ADMIN — IPRATROPIUM BROMIDE AND ALBUTEROL SULFATE 3 ML: .5; 3 SOLUTION RESPIRATORY (INHALATION) at 12:04

## 2017-01-01 RX ADMIN — Medication 10 ML: at 21:22

## 2017-01-01 RX ADMIN — UMECLIDINIUM 1 PUFF: 62.5 AEROSOL, POWDER ORAL at 18:18

## 2017-01-01 RX ADMIN — HEPARIN SODIUM 1500 UNITS: 1000 INJECTION, SOLUTION INTRAVENOUS; SUBCUTANEOUS at 16:36

## 2017-01-01 RX ADMIN — IPRATROPIUM BROMIDE AND ALBUTEROL SULFATE 3 ML: .5; 3 SOLUTION RESPIRATORY (INHALATION) at 15:53

## 2017-01-01 RX ADMIN — LIDOCAINE HYDROCHLORIDE 40 ML: 20 SOLUTION ORAL; TOPICAL at 21:46

## 2017-01-01 RX ADMIN — GUAIFENESIN 100 MG: 100 SOLUTION ORAL at 13:35

## 2017-01-01 RX ADMIN — POTASSIUM PHOSPHATE, MONOBASIC AND POTASSIUM PHOSPHATE, DIBASIC: 224; 236 INJECTION, SOLUTION INTRAVENOUS at 10:20

## 2017-01-01 RX ADMIN — METOPROLOL TARTRATE 12.5 MG: 25 TABLET ORAL at 08:34

## 2017-01-01 RX ADMIN — IPRATROPIUM BROMIDE AND ALBUTEROL SULFATE 3 ML: .5; 3 SOLUTION RESPIRATORY (INHALATION) at 04:36

## 2017-01-01 RX ADMIN — Medication 10 ML: at 05:35

## 2017-01-01 RX ADMIN — METOPROLOL TARTRATE 12.5 MG: 25 TABLET ORAL at 21:11

## 2017-01-01 RX ADMIN — AMOXICILLIN AND CLAVULANATE POTASSIUM 1 TABLET: 500; 125 TABLET, FILM COATED ORAL at 09:21

## 2017-01-01 RX ADMIN — PHENYLEPHRINE HYDROCHLORIDE 100 MCG/MIN: 10 INJECTION INTRAVENOUS at 06:27

## 2017-01-01 RX ADMIN — ASPIRIN 81 MG 81 MG: 81 TABLET ORAL at 09:21

## 2017-01-01 RX ADMIN — IPRATROPIUM BROMIDE AND ALBUTEROL SULFATE 3 ML: .5; 3 SOLUTION RESPIRATORY (INHALATION) at 04:01

## 2017-01-01 RX ADMIN — IPRATROPIUM BROMIDE AND ALBUTEROL SULFATE 3 ML: .5; 3 SOLUTION RESPIRATORY (INHALATION) at 15:18

## 2017-01-01 RX ADMIN — ASPIRIN 81 MG 81 MG: 81 TABLET ORAL at 09:59

## 2017-01-01 RX ADMIN — METOPROLOL TARTRATE 12.5 MG: 25 TABLET ORAL at 17:51

## 2017-01-01 RX ADMIN — GUAIFENESIN 100 MG: 100 SOLUTION ORAL at 21:24

## 2017-01-01 RX ADMIN — Medication 5 ML: at 14:00

## 2017-01-01 RX ADMIN — LEVOTHYROXINE SODIUM 50 MCG: 50 TABLET ORAL at 07:14

## 2017-01-01 RX ADMIN — IOPAMIDOL 24 ML: 755 INJECTION, SOLUTION INTRAVENOUS at 15:24

## 2017-01-01 RX ADMIN — MIDAZOLAM HYDROCHLORIDE 1 MG: 1 INJECTION, SOLUTION INTRAMUSCULAR; INTRAVENOUS at 16:31

## 2017-01-01 RX ADMIN — METOPROLOL TARTRATE 12.5 MG: 25 TABLET ORAL at 18:18

## 2017-01-01 RX ADMIN — IPRATROPIUM BROMIDE AND ALBUTEROL SULFATE 3 ML: .5; 3 SOLUTION RESPIRATORY (INHALATION) at 15:35

## 2017-01-01 RX ADMIN — HEPARIN SODIUM 1000 UNITS: 200 INJECTION, SOLUTION INTRAVENOUS at 15:16

## 2017-01-01 RX ADMIN — Medication 10 ML: at 15:09

## 2017-01-01 RX ADMIN — VALSARTAN 20 MG: 40 TABLET, FILM COATED ORAL at 09:21

## 2017-01-01 RX ADMIN — MUPIROCIN: 20 OINTMENT TOPICAL at 08:38

## 2017-01-01 RX ADMIN — SODIUM BICARBONATE 50 MEQ: 84 INJECTION, SOLUTION INTRAVENOUS at 06:32

## 2017-01-01 RX ADMIN — Medication 10 ML: at 05:33

## 2017-01-01 RX ADMIN — EPINEPHRINE 1 MG: 0.1 INJECTION, SOLUTION ENDOTRACHEAL; INTRACARDIAC; INTRAVENOUS at 06:27

## 2017-01-01 RX ADMIN — IPRATROPIUM BROMIDE AND ALBUTEROL SULFATE 3 ML: .5; 3 SOLUTION RESPIRATORY (INHALATION) at 20:28

## 2017-01-01 RX ADMIN — PANTOPRAZOLE SODIUM 40 MG: 40 TABLET, DELAYED RELEASE ORAL at 09:28

## 2017-01-01 RX ADMIN — METOPROLOL TARTRATE 12.5 MG: 25 TABLET ORAL at 18:19

## 2017-01-01 RX ADMIN — Medication 10 ML: at 17:44

## 2017-01-01 RX ADMIN — FLUTICASONE PROPIONATE 2 SPRAY: 50 SPRAY, METERED NASAL at 09:14

## 2017-01-01 RX ADMIN — CLOPIDOGREL BISULFATE 75 MG: 75 TABLET ORAL at 09:18

## 2017-01-01 RX ADMIN — VALSARTAN 20 MG: 40 TABLET, FILM COATED ORAL at 09:59

## 2017-01-01 RX ADMIN — EPTIFIBATIDE 1 MCG/KG/MIN: 0.75 INJECTION, SOLUTION INTRAVENOUS at 19:26

## 2017-01-01 RX ADMIN — IPRATROPIUM BROMIDE AND ALBUTEROL SULFATE 3 ML: .5; 3 SOLUTION RESPIRATORY (INHALATION) at 23:58

## 2017-01-01 RX ADMIN — IPRATROPIUM BROMIDE AND ALBUTEROL SULFATE 3 ML: .5; 3 SOLUTION RESPIRATORY (INHALATION) at 12:09

## 2017-01-01 RX ADMIN — PANTOPRAZOLE SODIUM 40 MG: 40 TABLET, DELAYED RELEASE ORAL at 09:59

## 2017-01-01 RX ADMIN — IPRATROPIUM BROMIDE AND ALBUTEROL SULFATE 3 ML: .5; 3 SOLUTION RESPIRATORY (INHALATION) at 11:37

## 2017-01-01 RX ADMIN — FENTANYL CITRATE 25 MCG: 50 INJECTION, SOLUTION INTRAMUSCULAR; INTRAVENOUS at 17:57

## 2017-01-01 RX ADMIN — PIPERACILLIN SODIUM,TAZOBACTAM SODIUM 2.25 G: 2; .25 INJECTION, POWDER, FOR SOLUTION INTRAVENOUS at 12:03

## 2017-01-01 RX ADMIN — GUAIFENESIN 100 MG: 100 SOLUTION ORAL at 21:07

## 2017-01-01 RX ADMIN — NITROGLYCERIN 1 INCH: 20 OINTMENT TOPICAL at 06:01

## 2017-01-01 RX ADMIN — CLOPIDOGREL BISULFATE 75 MG: 75 TABLET ORAL at 10:10

## 2017-01-01 RX ADMIN — VALSARTAN 20 MG: 40 TABLET, FILM COATED ORAL at 09:18

## 2017-01-01 RX ADMIN — POTASSIUM CHLORIDE 40 MEQ: 1.5 POWDER, FOR SOLUTION ORAL at 17:51

## 2017-01-01 RX ADMIN — SODIUM CHLORIDE 50 ML/HR: 900 INJECTION, SOLUTION INTRAVENOUS at 06:51

## 2017-01-01 RX ADMIN — GUAIFENESIN 100 MG: 100 SOLUTION ORAL at 10:10

## 2017-01-01 RX ADMIN — HEPARIN SODIUM 2000 UNITS: 1000 INJECTION, SOLUTION INTRAVENOUS; SUBCUTANEOUS at 15:49

## 2017-01-01 RX ADMIN — POTASSIUM CHLORIDE 40 MEQ: 20 TABLET, EXTENDED RELEASE ORAL at 09:50

## 2017-01-01 RX ADMIN — CLOPIDOGREL BISULFATE 75 MG: 75 TABLET ORAL at 08:35

## 2017-01-01 RX ADMIN — UMECLIDINIUM 1 PUFF: 62.5 AEROSOL, POWDER ORAL at 16:21

## 2017-01-01 RX ADMIN — GUAIFENESIN 100 MG: 100 SOLUTION ORAL at 18:18

## 2017-01-01 RX ADMIN — METOPROLOL TARTRATE 25 MG: 25 TABLET ORAL at 13:14

## 2017-01-01 RX ADMIN — FENTANYL CITRATE 25 MCG: 50 INJECTION, SOLUTION INTRAMUSCULAR; INTRAVENOUS at 18:20

## 2017-01-01 RX ADMIN — GUAIFENESIN 100 MG: 100 SOLUTION ORAL at 14:17

## 2017-01-01 RX ADMIN — SODIUM CHLORIDE 100 ML/HR: 900 INJECTION, SOLUTION INTRAVENOUS at 14:51

## 2017-01-01 RX ADMIN — PIPERACILLIN SODIUM,TAZOBACTAM SODIUM 2.25 G: 2; .25 INJECTION, POWDER, FOR SOLUTION INTRAVENOUS at 05:32

## 2017-01-01 RX ADMIN — CLOPIDOGREL BISULFATE 75 MG: 75 TABLET ORAL at 09:28

## 2017-01-01 RX ADMIN — ATORVASTATIN CALCIUM 40 MG: 40 TABLET, FILM COATED ORAL at 21:11

## 2017-01-01 RX ADMIN — IPRATROPIUM BROMIDE AND ALBUTEROL SULFATE 3 ML: .5; 3 SOLUTION RESPIRATORY (INHALATION) at 07:40

## 2017-01-01 RX ADMIN — PHENYLEPHRINE HYDROCHLORIDE 200 MCG: 10 INJECTION INTRAVENOUS at 17:12

## 2017-01-01 RX ADMIN — FLUTICASONE PROPIONATE 2 SPRAY: 50 SPRAY, METERED NASAL at 09:53

## 2017-01-01 RX ADMIN — IPRATROPIUM BROMIDE AND ALBUTEROL SULFATE 3 ML: .5; 3 SOLUTION RESPIRATORY (INHALATION) at 19:25

## 2017-01-01 RX ADMIN — GUAIFENESIN 100 MG: 100 SOLUTION ORAL at 18:07

## 2017-01-01 RX ADMIN — MIDAZOLAM HYDROCHLORIDE 2 MG: 1 INJECTION, SOLUTION INTRAMUSCULAR; INTRAVENOUS at 17:39

## 2017-01-01 RX ADMIN — CLOPIDOGREL BISULFATE 75 MG: 75 TABLET ORAL at 09:59

## 2017-01-01 RX ADMIN — GUAIFENESIN 100 MG: 100 SOLUTION ORAL at 09:21

## 2017-01-01 RX ADMIN — FLUTICASONE FUROATE AND VILANTEROL TRIFENATATE 1 PUFF: 100; 25 POWDER RESPIRATORY (INHALATION) at 09:59

## 2017-01-01 RX ADMIN — DIBASIC SODIUM PHOSPHATE, MONOBASIC POTASSIUM PHOSPHATE AND MONOBASIC SODIUM PHOSPHATE 1 TABLET: 852; 155; 130 TABLET ORAL at 09:28

## 2017-01-01 RX ADMIN — PIPERACILLIN SODIUM,TAZOBACTAM SODIUM 2.25 G: 2; .25 INJECTION, POWDER, FOR SOLUTION INTRAVENOUS at 00:12

## 2017-01-01 RX ADMIN — FENTANYL CITRATE 25 MCG: 50 INJECTION, SOLUTION INTRAMUSCULAR; INTRAVENOUS at 15:16

## 2017-01-01 RX ADMIN — GUAIFENESIN 100 MG: 100 SOLUTION ORAL at 21:22

## 2017-01-01 RX ADMIN — IPRATROPIUM BROMIDE AND ALBUTEROL SULFATE 3 ML: .5; 3 SOLUTION RESPIRATORY (INHALATION) at 06:39

## 2017-01-01 RX ADMIN — IOPAMIDOL 120 ML: 755 INJECTION, SOLUTION INTRAVENOUS at 16:07

## 2017-01-01 RX ADMIN — METOPROLOL TARTRATE 12.5 MG: 25 TABLET ORAL at 18:06

## 2017-01-01 RX ADMIN — POTASSIUM CHLORIDE 20 MEQ: 750 TABLET, FILM COATED, EXTENDED RELEASE ORAL at 13:35

## 2017-01-01 RX ADMIN — FLUTICASONE FUROATE AND VILANTEROL TRIFENATATE 1 PUFF: 100; 25 POWDER RESPIRATORY (INHALATION) at 09:24

## 2017-01-01 RX ADMIN — NITROGLYCERIN 0.4 MG: 0.4 TABLET SUBLINGUAL at 04:59

## 2017-01-01 RX ADMIN — PIPERACILLIN SODIUM,TAZOBACTAM SODIUM 2.25 G: 2; .25 INJECTION, POWDER, FOR SOLUTION INTRAVENOUS at 17:20

## 2017-01-01 RX ADMIN — ATROPINE SULFATE 1 MG: 0.1 INJECTION PARENTERAL at 06:24

## 2017-01-01 RX ADMIN — GUAIFENESIN 100 MG: 100 SOLUTION ORAL at 09:28

## 2017-01-01 RX ADMIN — METOPROLOL TARTRATE 12.5 MG: 25 TABLET ORAL at 05:33

## 2017-01-01 RX ADMIN — Medication 10 ML: at 06:13

## 2017-01-01 RX ADMIN — MUPIROCIN: 20 OINTMENT TOPICAL at 08:17

## 2017-01-01 RX ADMIN — AMOXICILLIN AND CLAVULANATE POTASSIUM 1 TABLET: 500; 125 TABLET, FILM COATED ORAL at 09:59

## 2017-01-01 RX ADMIN — FLUTICASONE FUROATE AND VILANTEROL TRIFENATATE 1 PUFF: 100; 25 POWDER RESPIRATORY (INHALATION) at 08:37

## 2017-01-01 RX ADMIN — GUAIFENESIN 100 MG: 100 SOLUTION ORAL at 18:27

## 2017-01-01 RX ADMIN — Medication 10 ML: at 05:32

## 2017-01-01 RX ADMIN — Medication 10 ML: at 13:07

## 2017-01-01 RX ADMIN — IPRATROPIUM BROMIDE AND ALBUTEROL SULFATE 3 ML: .5; 3 SOLUTION RESPIRATORY (INHALATION) at 19:51

## 2017-01-01 RX ADMIN — GUAIFENESIN 100 MG: 100 SOLUTION ORAL at 09:48

## 2017-01-01 RX ADMIN — IPRATROPIUM BROMIDE 0.5 MG: 0.5 SOLUTION RESPIRATORY (INHALATION) at 12:50

## 2017-01-01 RX ADMIN — LEVOTHYROXINE SODIUM 50 MCG: 50 TABLET ORAL at 18:45

## 2017-01-01 RX ADMIN — VALSARTAN 20 MG: 40 TABLET, FILM COATED ORAL at 09:50

## 2017-01-01 RX ADMIN — IPRATROPIUM BROMIDE AND ALBUTEROL SULFATE 3 ML: .5; 3 SOLUTION RESPIRATORY (INHALATION) at 11:42

## 2017-01-01 RX ADMIN — PANTOPRAZOLE SODIUM 40 MG: 40 TABLET, DELAYED RELEASE ORAL at 09:18

## 2017-01-01 RX ADMIN — UMECLIDINIUM 1 PUFF: 62.5 AEROSOL, POWDER ORAL at 17:52

## 2017-01-01 RX ADMIN — IPRATROPIUM BROMIDE AND ALBUTEROL SULFATE 3 ML: .5; 3 SOLUTION RESPIRATORY (INHALATION) at 15:54

## 2017-01-01 RX ADMIN — FENTANYL CITRATE 25 MCG: 50 INJECTION, SOLUTION INTRAMUSCULAR; INTRAVENOUS at 16:31

## 2017-01-01 RX ADMIN — ATORVASTATIN CALCIUM 40 MG: 40 TABLET, FILM COATED ORAL at 21:26

## 2017-01-01 RX ADMIN — IPRATROPIUM BROMIDE AND ALBUTEROL SULFATE 3 ML: .5; 3 SOLUTION RESPIRATORY (INHALATION) at 23:27

## 2017-01-01 RX ADMIN — UMECLIDINIUM 1 PUFF: 62.5 AEROSOL, POWDER ORAL at 17:46

## 2017-01-01 RX ADMIN — EPTIFIBATIDE 1 MCG/KG/MIN: 0.75 INJECTION, SOLUTION INTRAVENOUS at 18:05

## 2017-01-01 RX ADMIN — ALPRAZOLAM 1 MG: 0.5 TABLET ORAL at 09:21

## 2017-01-01 RX ADMIN — ACETAMINOPHEN 1000 MG: 10 INJECTION, SOLUTION INTRAVENOUS at 16:34

## 2017-01-01 RX ADMIN — MUPIROCIN: 20 OINTMENT TOPICAL at 17:28

## 2017-01-01 RX ADMIN — METOPROLOL TARTRATE 12.5 MG: 25 TABLET ORAL at 05:38

## 2017-01-01 RX ADMIN — FLUTICASONE FUROATE AND VILANTEROL TRIFENATATE 1 PUFF: 100; 25 POWDER RESPIRATORY (INHALATION) at 09:48

## 2017-01-01 RX ADMIN — Medication 10 ML: at 23:05

## 2017-01-01 RX ADMIN — METOPROLOL TARTRATE 12.5 MG: 25 TABLET ORAL at 05:36

## 2017-01-01 RX ADMIN — GUAIFENESIN 100 MG: 100 SOLUTION ORAL at 17:52

## 2017-01-01 RX ADMIN — ATORVASTATIN CALCIUM 40 MG: 40 TABLET, FILM COATED ORAL at 21:22

## 2017-01-01 RX ADMIN — IPRATROPIUM BROMIDE AND ALBUTEROL SULFATE 3 ML: .5; 3 SOLUTION RESPIRATORY (INHALATION) at 16:58

## 2017-01-01 RX ADMIN — GUAIFENESIN 100 MG: 100 SOLUTION ORAL at 09:18

## 2017-01-01 RX ADMIN — IOPAMIDOL 20 ML: 755 INJECTION, SOLUTION INTRAVENOUS at 18:21

## 2017-01-01 RX ADMIN — ATORVASTATIN CALCIUM 40 MG: 40 TABLET, FILM COATED ORAL at 22:58

## 2017-01-01 RX ADMIN — CLOPIDOGREL 600 MG: 300 TABLET, FILM COATED ORAL at 21:11

## 2017-01-01 RX ADMIN — GUAIFENESIN 100 MG: 100 SOLUTION ORAL at 22:00

## 2017-01-01 RX ADMIN — ASPIRIN 81 MG 81 MG: 81 TABLET ORAL at 09:50

## 2017-01-01 RX ADMIN — MUPIROCIN: 20 OINTMENT TOPICAL at 08:29

## 2017-01-01 RX ADMIN — PANTOPRAZOLE SODIUM 40 MG: 40 TABLET, DELAYED RELEASE ORAL at 09:51

## 2017-01-01 RX ADMIN — PIPERACILLIN SODIUM,TAZOBACTAM SODIUM 2.25 G: 2; .25 INJECTION, POWDER, FOR SOLUTION INTRAVENOUS at 17:53

## 2017-01-01 RX ADMIN — PROPOFOL 35 MCG/KG/MIN: 10 INJECTION, EMULSION INTRAVENOUS at 03:52

## 2017-01-01 RX ADMIN — LEVOTHYROXINE SODIUM 50 MCG: 50 TABLET ORAL at 17:54

## 2017-01-01 RX ADMIN — IPRATROPIUM BROMIDE AND ALBUTEROL SULFATE 3 ML: .5; 3 SOLUTION RESPIRATORY (INHALATION) at 19:53

## 2017-01-01 RX ADMIN — EPINEPHRINE 1 MG: 0.1 INJECTION, SOLUTION ENDOTRACHEAL; INTRACARDIAC; INTRAVENOUS at 06:31

## 2017-01-01 RX ADMIN — IPRATROPIUM BROMIDE AND ALBUTEROL SULFATE 3 ML: .5; 3 SOLUTION RESPIRATORY (INHALATION) at 13:08

## 2017-01-01 RX ADMIN — ASPIRIN 81 MG 81 MG: 81 TABLET ORAL at 09:17

## 2017-01-01 RX ADMIN — FLUTICASONE FUROATE AND VILANTEROL TRIFENATATE 1 PUFF: 100; 25 POWDER RESPIRATORY (INHALATION) at 08:16

## 2017-01-01 RX ADMIN — Medication 10 ML: at 06:27

## 2017-01-01 RX ADMIN — IPRATROPIUM BROMIDE AND ALBUTEROL SULFATE 3 ML: .5; 3 SOLUTION RESPIRATORY (INHALATION) at 19:21

## 2017-01-01 RX ADMIN — LEVOTHYROXINE SODIUM 75 MCG: 75 TABLET ORAL at 08:35

## 2017-01-01 RX ADMIN — Medication 75 MCG/HR: at 00:26

## 2017-01-01 RX ADMIN — METOPROLOL TARTRATE 12.5 MG: 25 TABLET ORAL at 17:25

## 2017-01-01 RX ADMIN — EPTIFIBATIDE 5.4 MG: 0.75 INJECTION, SOLUTION INTRAVENOUS at 18:02

## 2017-01-01 RX ADMIN — IPRATROPIUM BROMIDE AND ALBUTEROL SULFATE 3 ML: .5; 3 SOLUTION RESPIRATORY (INHALATION) at 11:36

## 2017-01-01 RX ADMIN — PIPERACILLIN SODIUM,TAZOBACTAM SODIUM 2.25 G: 2; .25 INJECTION, POWDER, FOR SOLUTION INTRAVENOUS at 13:27

## 2017-01-01 RX ADMIN — Medication 10 ML: at 06:05

## 2017-01-01 RX ADMIN — IPRATROPIUM BROMIDE AND ALBUTEROL SULFATE 3 ML: .5; 3 SOLUTION RESPIRATORY (INHALATION) at 17:37

## 2017-01-01 RX ADMIN — IPRATROPIUM BROMIDE AND ALBUTEROL SULFATE 3 ML: .5; 3 SOLUTION RESPIRATORY (INHALATION) at 07:42

## 2017-01-01 RX ADMIN — PROPOFOL 50 MCG/KG/MIN: 10 INJECTION, EMULSION INTRAVENOUS at 01:17

## 2017-01-01 RX ADMIN — PIPERACILLIN SODIUM,TAZOBACTAM SODIUM 2.25 G: 2; .25 INJECTION, POWDER, FOR SOLUTION INTRAVENOUS at 18:46

## 2017-01-01 RX ADMIN — MUPIROCIN: 20 OINTMENT TOPICAL at 09:48

## 2017-01-01 RX ADMIN — MUPIROCIN: 20 OINTMENT TOPICAL at 09:26

## 2017-01-01 RX ADMIN — GUAIFENESIN 100 MG: 100 SOLUTION ORAL at 17:26

## 2017-01-01 RX ADMIN — METOPROLOL TARTRATE 12.5 MG: 25 TABLET ORAL at 05:35

## 2017-01-01 RX ADMIN — IPRATROPIUM BROMIDE 0.5 MG: 0.5 SOLUTION RESPIRATORY (INHALATION) at 12:19

## 2017-01-01 RX ADMIN — MUPIROCIN: 20 OINTMENT TOPICAL at 17:39

## 2017-01-01 RX ADMIN — IPRATROPIUM BROMIDE AND ALBUTEROL SULFATE 3 ML: .5; 3 SOLUTION RESPIRATORY (INHALATION) at 19:33

## 2017-01-01 RX ADMIN — LIDOCAINE HYDROCHLORIDE 9 ML: 10 INJECTION INFILTRATION; PERINEURAL at 15:19

## 2017-01-01 RX ADMIN — GUAIFENESIN 100 MG: 100 SOLUTION ORAL at 09:59

## 2017-01-01 RX ADMIN — MUPIROCIN: 20 OINTMENT TOPICAL at 17:47

## 2017-01-01 RX ADMIN — UMECLIDINIUM 1 PUFF: 62.5 AEROSOL, POWDER ORAL at 15:55

## 2017-01-01 RX ADMIN — PANTOPRAZOLE SODIUM 40 MG: 40 TABLET, DELAYED RELEASE ORAL at 09:21

## 2017-01-01 RX ADMIN — FLUTICASONE FUROATE AND VILANTEROL TRIFENATATE 1 PUFF: 100; 25 POWDER RESPIRATORY (INHALATION) at 09:20

## 2017-01-01 RX ADMIN — MUPIROCIN 1 G: 20 OINTMENT TOPICAL at 23:50

## 2017-01-01 RX ADMIN — SODIUM CHLORIDE 40 MG: 9 INJECTION INTRAMUSCULAR; INTRAVENOUS; SUBCUTANEOUS at 09:18

## 2017-01-01 RX ADMIN — UMECLIDINIUM 1 PUFF: 62.5 AEROSOL, POWDER ORAL at 17:44

## 2017-01-01 RX ADMIN — IPRATROPIUM BROMIDE AND ALBUTEROL SULFATE 3 ML: .5; 3 SOLUTION RESPIRATORY (INHALATION) at 01:02

## 2017-01-01 RX ADMIN — PHENYLEPHRINE HYDROCHLORIDE 200 MCG: 10 INJECTION INTRAVENOUS at 17:09

## 2017-01-01 RX ADMIN — HEPARIN SODIUM 1500 UNITS: 1000 INJECTION, SOLUTION INTRAVENOUS; SUBCUTANEOUS at 16:09

## 2017-01-01 RX ADMIN — DIBASIC SODIUM PHOSPHATE, MONOBASIC POTASSIUM PHOSPHATE AND MONOBASIC SODIUM PHOSPHATE 1 TABLET: 852; 155; 130 TABLET ORAL at 18:19

## 2017-01-01 RX ADMIN — IPRATROPIUM BROMIDE AND ALBUTEROL SULFATE 3 ML: .5; 3 SOLUTION RESPIRATORY (INHALATION) at 08:33

## 2017-01-01 RX ADMIN — EPINEPHRINE 1 MG: 0.1 INJECTION, SOLUTION ENDOTRACHEAL; INTRACARDIAC; INTRAVENOUS at 06:18

## 2017-01-01 RX ADMIN — MIDAZOLAM HYDROCHLORIDE 1 MG: 1 INJECTION, SOLUTION INTRAMUSCULAR; INTRAVENOUS at 16:18

## 2017-01-01 RX ADMIN — PIPERACILLIN SODIUM,TAZOBACTAM SODIUM 2.25 G: 2; .25 INJECTION, POWDER, FOR SOLUTION INTRAVENOUS at 23:33

## 2017-01-01 RX ADMIN — LEVOTHYROXINE SODIUM 50 MCG: 50 TABLET ORAL at 05:34

## 2017-01-01 RX ADMIN — GUAIFENESIN 100 MG: 100 SOLUTION ORAL at 18:08

## 2017-01-01 RX ADMIN — Medication 10 ML: at 21:24

## 2017-01-01 RX ADMIN — ATORVASTATIN CALCIUM 40 MG: 40 TABLET, FILM COATED ORAL at 21:07

## 2017-01-01 RX ADMIN — IPRATROPIUM BROMIDE AND ALBUTEROL SULFATE 3 ML: .5; 3 SOLUTION RESPIRATORY (INHALATION) at 03:12

## 2017-01-01 RX ADMIN — IPRATROPIUM BROMIDE AND ALBUTEROL SULFATE 3 ML: .5; 3 SOLUTION RESPIRATORY (INHALATION) at 04:52

## 2017-01-01 RX ADMIN — Medication 10 ML: at 21:33

## 2017-01-01 RX ADMIN — EPINEPHRINE 1 MG: 0.1 INJECTION, SOLUTION ENDOTRACHEAL; INTRACARDIAC; INTRAVENOUS at 06:21

## 2017-01-01 RX ADMIN — AMOXICILLIN AND CLAVULANATE POTASSIUM 1 TABLET: 500; 125 TABLET, FILM COATED ORAL at 21:07

## 2017-01-01 RX ADMIN — IPRATROPIUM BROMIDE AND ALBUTEROL SULFATE 3 ML: .5; 3 SOLUTION RESPIRATORY (INHALATION) at 08:08

## 2017-01-01 RX ADMIN — IPRATROPIUM BROMIDE AND ALBUTEROL SULFATE 3 ML: .5; 3 SOLUTION RESPIRATORY (INHALATION) at 09:48

## 2017-01-01 RX ADMIN — HEPARIN SODIUM 50 ML/HR: 5000 INJECTION, SOLUTION INTRAVENOUS; SUBCUTANEOUS at 16:29

## 2017-01-01 RX ADMIN — POTASSIUM CHLORIDE 20 MEQ: 750 TABLET, FILM COATED, EXTENDED RELEASE ORAL at 09:21

## 2017-01-01 RX ADMIN — HEPARIN SODIUM 2000 UNITS: 1000 INJECTION, SOLUTION INTRAVENOUS; SUBCUTANEOUS at 16:00

## 2017-01-01 RX ADMIN — ALPRAZOLAM 0.25 MG: 0.25 TABLET ORAL at 04:14

## 2017-01-01 RX ADMIN — IPRATROPIUM BROMIDE AND ALBUTEROL SULFATE 3 ML: .5; 3 SOLUTION RESPIRATORY (INHALATION) at 03:42

## 2017-01-01 RX ADMIN — PIPERACILLIN SODIUM,TAZOBACTAM SODIUM 2.25 G: 2; .25 INJECTION, POWDER, FOR SOLUTION INTRAVENOUS at 11:47

## 2017-01-01 RX ADMIN — PIPERACILLIN SODIUM,TAZOBACTAM SODIUM 2.25 G: 2; .25 INJECTION, POWDER, FOR SOLUTION INTRAVENOUS at 17:56

## 2017-01-01 RX ADMIN — ATORVASTATIN CALCIUM 40 MG: 40 TABLET, FILM COATED ORAL at 21:24

## 2017-01-01 RX ADMIN — HEPARIN SODIUM AND DEXTROSE 600 UNITS/HR: 10000; 5 INJECTION INTRAVENOUS at 13:16

## 2017-01-01 RX ADMIN — GUAIFENESIN 100 MG: 100 SOLUTION ORAL at 13:48

## 2017-01-01 RX ADMIN — UMECLIDINIUM 1 PUFF: 62.5 AEROSOL, POWDER ORAL at 17:55

## 2017-01-01 RX ADMIN — Medication 10 ML: at 13:54

## 2017-01-01 RX ADMIN — GUAIFENESIN 100 MG: 100 SOLUTION ORAL at 13:07

## 2017-01-01 RX ADMIN — IPRATROPIUM BROMIDE AND ALBUTEROL SULFATE 3 ML: .5; 3 SOLUTION RESPIRATORY (INHALATION) at 16:28

## 2017-01-01 RX ADMIN — IPRATROPIUM BROMIDE AND ALBUTEROL SULFATE 3 ML: .5; 3 SOLUTION RESPIRATORY (INHALATION) at 00:14

## 2017-01-01 RX ADMIN — PIPERACILLIN SODIUM,TAZOBACTAM SODIUM 2.25 G: 2; .25 INJECTION, POWDER, FOR SOLUTION INTRAVENOUS at 23:38

## 2017-01-01 RX ADMIN — FLUTICASONE FUROATE AND VILANTEROL TRIFENATATE 1 PUFF: 100; 25 POWDER RESPIRATORY (INHALATION) at 09:14

## 2017-01-01 RX ADMIN — Medication 10 ML: at 14:52

## 2017-01-01 RX ADMIN — Medication 10 ML: at 21:42

## 2017-01-01 RX ADMIN — AMOXICILLIN AND CLAVULANATE POTASSIUM 1 TABLET: 500; 125 TABLET, FILM COATED ORAL at 21:24

## 2017-01-01 RX ADMIN — DOPAMINE HYDROCHLORIDE 20 MCG/KG/MIN: 320 INJECTION, SOLUTION INTRAVENOUS at 06:24

## 2017-01-01 RX ADMIN — METOPROLOL TARTRATE 12.5 MG: 25 TABLET ORAL at 17:55

## 2017-01-01 RX ADMIN — AMOXICILLIN AND CLAVULANATE POTASSIUM 1 TABLET: 500; 125 TABLET, FILM COATED ORAL at 21:22

## 2017-01-01 RX ADMIN — MUPIROCIN: 20 OINTMENT TOPICAL at 18:18

## 2017-01-01 RX ADMIN — ALBUTEROL SULFATE 5 MG: 2.5 SOLUTION RESPIRATORY (INHALATION) at 12:50

## 2017-01-01 RX ADMIN — IPRATROPIUM BROMIDE AND ALBUTEROL SULFATE 3 ML: .5; 3 SOLUTION RESPIRATORY (INHALATION) at 03:35

## 2017-01-01 RX ADMIN — CLOPIDOGREL BISULFATE 75 MG: 75 TABLET ORAL at 09:19

## 2017-01-01 RX ADMIN — PIPERACILLIN SODIUM,TAZOBACTAM SODIUM 2.25 G: 2; .25 INJECTION, POWDER, FOR SOLUTION INTRAVENOUS at 05:38

## 2017-01-01 RX ADMIN — PROPOFOL 25 MCG/KG/MIN: 10 INJECTION, EMULSION INTRAVENOUS at 17:15

## 2017-01-01 RX ADMIN — FLUTICASONE PROPIONATE 2 SPRAY: 50 SPRAY, METERED NASAL at 10:14

## 2017-01-01 RX ADMIN — POTASSIUM PHOSPHATE, MONOBASIC AND POTASSIUM PHOSPHATE, DIBASIC: 224; 236 INJECTION, SOLUTION INTRAVENOUS at 18:06

## 2017-01-01 RX ADMIN — MIDAZOLAM HYDROCHLORIDE 1 MG: 1 INJECTION, SOLUTION INTRAMUSCULAR; INTRAVENOUS at 15:16

## 2017-01-01 RX ADMIN — FLUTICASONE PROPIONATE 2 SPRAY: 50 SPRAY, METERED NASAL at 08:37

## 2017-01-01 RX ADMIN — FLUTICASONE PROPIONATE 2 SPRAY: 50 SPRAY, METERED NASAL at 09:24

## 2017-01-01 RX ADMIN — ASPIRIN 81 MG 81 MG: 81 TABLET ORAL at 08:36

## 2017-01-01 RX ADMIN — LEVOTHYROXINE SODIUM 75 MCG: 75 TABLET ORAL at 05:37

## 2017-01-01 RX ADMIN — METOPROLOL TARTRATE 12.5 MG: 25 TABLET ORAL at 05:32

## 2017-01-01 RX ADMIN — CHLORHEXIDINE GLUCONATE 15 ML: 1.2 RINSE ORAL at 08:27

## 2017-01-01 RX ADMIN — IPRATROPIUM BROMIDE AND ALBUTEROL SULFATE 3 ML: .5; 3 SOLUTION RESPIRATORY (INHALATION) at 02:41

## 2017-01-01 RX ADMIN — IPRATROPIUM BROMIDE AND ALBUTEROL SULFATE 3 ML: .5; 3 SOLUTION RESPIRATORY (INHALATION) at 07:50

## 2017-01-01 RX ADMIN — IPRATROPIUM BROMIDE AND ALBUTEROL SULFATE 3 ML: .5; 3 SOLUTION RESPIRATORY (INHALATION) at 17:14

## 2017-01-01 RX ADMIN — ASPIRIN 81 MG 81 MG: 81 TABLET ORAL at 10:10

## 2017-01-01 RX ADMIN — ATORVASTATIN CALCIUM 40 MG: 40 TABLET, FILM COATED ORAL at 21:03

## 2017-01-01 RX ADMIN — FLUTICASONE PROPIONATE 2 SPRAY: 50 SPRAY, METERED NASAL at 09:58

## 2017-01-01 RX ADMIN — LIDOCAINE HYDROCHLORIDE 9 ML: 10 INJECTION, SOLUTION INFILTRATION; PERINEURAL at 15:19

## 2017-01-01 RX ADMIN — Medication 10 ML: at 21:03

## 2017-01-01 RX ADMIN — BUMETANIDE 1 MG: 0.25 INJECTION INTRAMUSCULAR; INTRAVENOUS at 06:05

## 2017-01-01 RX ADMIN — CLOPIDOGREL BISULFATE 75 MG: 75 TABLET ORAL at 09:50

## 2017-01-01 RX ADMIN — ASPIRIN 81 MG 81 MG: 81 TABLET ORAL at 09:28

## 2017-01-01 RX ADMIN — GUAIFENESIN 100 MG: 100 SOLUTION ORAL at 21:03

## 2017-01-01 RX ADMIN — IPRATROPIUM BROMIDE AND ALBUTEROL SULFATE 3 ML: .5; 3 SOLUTION RESPIRATORY (INHALATION) at 16:01

## 2017-01-01 RX ADMIN — MIDAZOLAM HYDROCHLORIDE 1 MG: 1 INJECTION INTRAMUSCULAR; INTRAVENOUS at 15:16

## 2017-01-01 RX ADMIN — GUAIFENESIN 100 MG: 100 SOLUTION ORAL at 08:36

## 2017-01-01 RX ADMIN — PIPERACILLIN SODIUM,TAZOBACTAM SODIUM 2.25 G: 2; .25 INJECTION, POWDER, FOR SOLUTION INTRAVENOUS at 06:13

## 2017-01-01 RX ADMIN — PIPERACILLIN SODIUM,TAZOBACTAM SODIUM 2.25 G: 2; .25 INJECTION, POWDER, FOR SOLUTION INTRAVENOUS at 06:26

## 2017-01-01 RX ADMIN — GUAIFENESIN 100 MG: 100 SOLUTION ORAL at 14:48

## 2017-01-03 PROBLEM — I21.4 NSTEMI (NON-ST ELEVATED MYOCARDIAL INFARCTION) (HCC): Status: ACTIVE | Noted: 2017-01-01

## 2017-01-03 NOTE — PROCEDURES
Intubation Note    Called to bedside secondary to need for relaxation/sedation during cath lab procedure. Start:  8901  End:   6302  Patient pre-oxygenated with 100% oxygen. Smooth RSI with Propofol 150 mg + Succinylcholine 120 mg IV. DVL x 1    7.0 ETT taped and secured at 21 cm at the teeth.    + Bilateral BS, + Chest rise, + ETCO2    CXR pending.     Care turned over to covering Attending MD.

## 2017-01-03 NOTE — H&P
[address] Pr[address]ar   HISTORY AND PHYSICAL       Name:  Victoriano Jin   MR#:  [id number]   :  1945   Account #:  [de-identified]        Date of Adm:  2017       PRIMARY CARE PHYSICIAN: Dr. Abel Bustillo: She is seen in the emergency room. EMERGENCY ROOM PHYSICIAN: Dr. Carletha Severin: Chest pain. HISTORY OF PRESENT ILLNESS: She was transferred here from   [facility].      She has a history of heart disease, and she has been treated at   [facility]. In , she was mowing her lawn when she   hit a yellow jacket nest and got stung by a swarm of yellow jackets. She was taken to [facility] and she actually   had a myocardial infarction. She was transferred to [facility], where she underwent angioplasty and stenting of an artery,   but we do not know the details. She has not had any cardiac issues   since. She follows with a cardiologist in [place] and now in [place]. This morning about 3 a.m., she was awakened with severe aching and   angina in the chest. She took an aspirin. I do not believe she took   nitroglycerin. She went to [facility]. EKG there   showed acute anterior ischemic changes. She was treated with   heparin, nitrates, aspirin and she stabilized. Repeat EKG shows   resolution of the dynamic ischemic changes. Arrangements were made   for transfer here. She was transferred here by ground transport due to the rain. She is   pain-free at this time. Earlier today, her pains were rated 8/10 and she   had some diaphoresis. Her other main issue is severe COPD. She is a chronic smoker, 1-2   packs a day and has a longstanding history of severe COPD. She is   quite short of breath from this. She uses a number of inhalers for this. She is on medication for hyperlipidemia. No history of diabetes mellitus or hypertension. SOCIAL HISTORY: She [place]. 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 Smokes 1-2 packs per day   for 50 years. FAMILY HISTORY: Negative for heart disease. REVIEW OF SYSTEMS   Comprehensive system review is reviewed reveals the above findings. PHYSICAL EXAMINATION   GENERAL: Appearing her stated age in no acute distress . VITAL SIGNS: Pulse is 80, BP is 120/90, respirations 16, O2   saturation 94% on room air. HEENT: Atraumatic. SKIN: Warm and dry. LUNGS: Markedly decreased breath sounds. No rales or wheezing. HEART: Very distant heart tones. No murmurs, rubs or gallops are   heard. ABDOMEN: Soft, nontender. EXTREMITIES: No cyanosis, clubbing or edema. NEUROLOGIC: Grossly normal. No focal motor deficits. GENERAL BODY HABITUS: Chronically cachectic appearing. ASSESSMENT:   1. Acute non-ST elevation myocardial infarction. 2. Acute unstable angina pectoris, class 4.   3. Coronary artery atherosclerosis. 4. Status post remote myocardial infarction in 2010.   5. Status post remote percutaneous coronary intervention with   unknown coronary artery in [facility] in 2010. 6. Severe chronic obstructive pulmonary disease. 7. Hyperlipidemia. PLAN: She is going to be admitted and started on antianginal therapy. She was on a heparin infusion on transport and we will continue the   heparin for now. Start her on nitrates and give her a low dose of beta   blocker. She is going to need to undergo cardiac catheterization assessment   for progressive coronary disease and we will see what the schedule   looks like and try to get her on the catheterization scheduled later   today. She is stable at this point. She has no chest pain.  Repeat EKG   here is normal.        MD GRADY Goldstein / GOLD   D:  01/03/2017   13:06 T:  01/03/2017   13:31   Job #:  [id number]

## 2017-01-03 NOTE — ED NOTES
Pt arrives via [facility] from [place] for NSTEMI  Pt reports onset of mid-sternal CP x 330 am radiating into L arm States she called EMS and was taken to ED Pt states she had 324 mg ASA prior to her arrival at [place]. Pt arrives with IV 20 gauge in L FA infusing Heparin at 540 units/hr. IV 20 gauge in R FA SL. Pt received 8 mg IV Zofran at [facility] and total of 100 mcg of IV Fentanyl. Pt received 2400 unit Heparin bolus prior to initiation of drip.      Pt arrives alert and oriented x 4 Skin warm dry intact reports pain 2/10 upon arrival. Pt placed on cardiac monitor x 3 Updated on plan with pending evaluation by MD

## 2017-01-03 NOTE — ED NOTES
Cardiac cath lab bedside. Patient signed consent with this RN for cath procedure and any stents/balloons Patient didn't initial bypass as she states it wasn't discussed. IVF hung as ordered by Dr Kait Ramos.

## 2017-01-03 NOTE — ED PROVIDER NOTES
HPI Comments: Danielle Sun is a 66 y.o. female with PMhx significant for stents, MI (2010), and COPD who presents via EMS from [facility] to [facility] ED with cc of chest tightness and chest pressure since 0400 this morning. Pt states that her sx radiate into her left arm. She describes this a tingling sensation. She reports taking Aspirin with no relief. However upon examination she states her pain is subsiding rating it a 2/10. She notes being anxious and under a lot of stress during the holidays, stating she is unsure if this is related to her sx. She reports additional sx of a productive cough and SOB, noting this at baseline due to her COPD. She reports taking Cardizem and baby Aspirin daily. She denies being on O2 at home. She denies any radiation of her sx to her jaw, shoulder, and back. She denies any sx of nausea and vomiting at this time. SHx: + tobacco use     PCP: Christian Briscoe MD    There are no other complaints, changes or physical findings at this time. Written by Marcela Murray ED Scribe, as dictated by Espinoza Hoff MD.            The history is provided by the patient. No  was used.         Past Medical History:   Diagnosis Date    Adverse effect of anesthesia      difficult to wake x 1    Anxiety     Bipolar 1 disorder (HCC)     Chronic lung disease     Colon polyps 2001, 2006    Constipation     [facility] obstructive [facility]) [facility] [address]     Diverticulitis     Emphysema 2013    Gonorrhea 26's    HSV infection     Hypercholesteremia     Hypothyroid     IBS (irritable bowel syndrome)      no formal diagnosis    Menarche      onset at age 12    Menopause      onset at age 45    MI (myocardial infarction) ([facility] [address] 08/2010    Osteopenia     S/P coronary artery stent placement 2010       Past Surgical History:   Procedure Laterality Date    Hx colonoscopy  03/01, 03/06, 10/11     q5 yr.    Hx hemorrhoidectomy      Hx gyn bartholins cyst excised    Hx colposcopy  02/16/2012, 12/2009, 11/2008, 10/20/2005    Hx heart catheterization  08/2010     with stent    Colonoscopy N/A 12/22/2016     COLONOSCOPY with Polypectomy performed by Danna Delgado MD at [facility]         Family History:   Problem Relation Age of Onset    Heart Attack Brother     Cancer Maternal Grandmother 40    Heart Attack Other     Cancer Mother      Intestines    Diabetes Sister     Stroke Sister     Colon Polyps Father     Other Other      melanoma       Social History     Social History    Marital status:      Spouse name: N/A    Number of children: N/A    Years of education: N/A     Occupational History    Not on file. Social History Main Topics    Smoking status: Current Every Day Smoker     Packs/day: 0.25     Years: 57.00     Types: Cigarettes    Smokeless tobacco: Never Used    Alcohol use 0.0 oz/week     0 Standard drinks or equivalent per week      Comment: rare    Drug use: No    Sexual activity: No     Other Topics Concern    Not on file     Social History Narrative         ALLERGIES: Betadine [povidone-iodine]    Review of Systems   Constitutional: Negative for chills, diaphoresis, fever and unexpected weight change. HENT: Negative for rhinorrhea and sore throat. Eyes: Negative for pain. Respiratory: Positive for cough, chest tightness and shortness of breath. Negative for wheezing and stridor. Cardiovascular: Positive for chest pain. Negative for leg swelling. Gastrointestinal: Negative for abdominal pain, blood in stool, diarrhea, nausea and vomiting. Genitourinary: Negative for difficulty urinating, dysuria and flank pain. Musculoskeletal: Negative for back pain and neck stiffness. Skin: Negative for rash. Neurological: Negative for seizures, syncope, weakness, light-headedness and headaches. +tingling in left arm   Psychiatric/Behavioral: Negative for confusion.    All other systems reviewed and are negative. Patient Vitals for the past 12 hrs:   Temp Pulse Resp BP SpO2   01/03/17 1440 - 80 15 98/82 93 %   01/03/17 1302 - 80 20 107/63 100 %   01/03/17 1250 - 83 - (!) 118/91 -   01/03/17 1124 98.4 °F (36.9 °C) 80 16 (!) 151/113 94 %     Physical Exam   Constitutional: She is oriented to person, place, and time. She appears well-developed and well-nourished. No distress. Frail    HENT:   Nose: Nose normal.   Mouth/Throat: Oropharynx is clear and moist. No oropharyngeal exudate. Eyes: Conjunctivae and EOM are normal. Pupils are equal, round, and reactive to light. Right eye exhibits no discharge. Left eye exhibits no discharge. No scleral icterus. Neck: Normal range of motion. Neck supple. No JVD present. Cardiovascular: Normal rate, regular rhythm, normal heart sounds and intact distal pulses. No murmur heard. Pulmonary/Chest: Effort normal and breath sounds normal. No stridor. No respiratory distress. She has no wheezes. She has no rales. Abdominal: Soft. Bowel sounds are normal. She exhibits no distension. There is no tenderness. There is no rebound and no guarding. Musculoskeletal: She exhibits no edema or tenderness. Neurological: She is alert and oriented to person, place, and time. Skin: Skin is warm and dry. No rash noted. She is not diaphoretic. Psychiatric: Her mood appears anxious. Nursing note and vitals reviewed.        MDM  Number of Diagnoses or Management Options  Unstable angina [facility]):      Amount and/or Complexity of Data Reviewed  Clinical lab tests: ordered and reviewed  Tests in the radiology section of CPT®: ordered and reviewed  Tests in the medicine section of CPT®: ordered and reviewed  Review and summarize past medical records: yes  Discuss the patient with other providers: yes (Cardiology )  Independent visualization of images, tracings, or specimens: yes    Critical Care  Total time providing critical care: 30-74 minutes    Patient Progress  Patient progress: stable    ED Course       Procedures   EKG interpretation: (Preliminary) 1145  Rhythm: sinus rhythm with premature atrial complexes; and regular . Rate (approx.): 75; Axis: normal; P wave: normal; QRS interval: normal ; ST/T wave: non-specific T wave abnormality;   Written by DUSTIN Yee, as dictated by Elham Shrestha MD.    CONSULT NOTE:  12:49 PM  Elham Shrestha MD spoke with Dr. Jeffrey Rich,  Specialty: Cardiology   Discussed pt's hx, disposition, and available diagnostic and imaging results. Reviewed care plans. Consultant agrees with plans as outlined. Written by DUSTIN Yee, as dictated by Elham Shrestha MD.    PROGRESS NOTE:  12:49 PM  Dr. Jeffrey Rich is at the pt's bedside. Written by DUSTIN Yee, as dictated by Elham Shrestha MD.    1:08 PM  Dr. Jeffrey Rich agrees to admit the pt. Written by DUSTIN Yee, as dictated by Elham Shrestha MD.    Critical Care: The reason for providing this level of medical care for this critically ill patient was due to a critical illness that impaired one or more vital organ systems such that there was a high probability of imminent or life threatening deterioration in the patients condition. This care involved high complexity decision making to assess, manipulate, and support vital system functions. LABORATORY TESTS:  Recent Results (from the past 12 hour(s))   CBC WITH AUTOMATED DIFF    Collection Time: 01/03/17 11:39 AM   Result Value Ref Range    WBC 15.8 (H) 3.6 - 11.0 K/uL    RBC 3.84 3.80 - 5.20 M/uL    HGB 12.2 11.5 - 16.0 g/dL    HCT 36.5 35.0 - 47.0 %    MCV 95.1 80.0 - 99.0 FL    MCH 31.8 26.0 - 34.0 PG    MCHC 33.4 30.0 - 36.5 g/dL    RDW 13.5 11.5 - 14.5 %    PLATELET 532 118 - 312 K/uL    NEUTROPHILS 85 (H) 32 - 75 %    BANDS 6 0 - 6 %    LYMPHOCYTES 6 (L) 12 - 49 %    MONOCYTES 2 (L) 5 - 13 %    EOSINOPHILS 0 0 - 7 %    BASOPHILS 1 0 - 1 %    ABS.  NEUTROPHILS 14.4 (H) 1.8 - 8.0 K/UL ABS. LYMPHOCYTES 0.9 0.8 - 3.5 K/UL    ABS. MONOCYTES 0.3 0.0 - 1.0 K/UL    ABS. EOSINOPHILS 0.0 0.0 - 0.4 K/UL    ABS. BASOPHILS 0.2 (H) 0.0 - 0.1 K/UL    RBC COMMENTS NORMOCYTIC, NORMOCHROMIC      DF MANUAL     PTT    Collection Time: 01/03/17 11:39 AM   Result Value Ref Range    aPTT 67.5 (H) 22.1 - 32.5 sec    aPTT, therapeutic range     58.0 - 77.0 SECS   TROPONIN I    Collection Time: 01/03/17 11:40 AM   Result Value Ref Range    Troponin-I, Qt. 1.39 (H) <0.05 ng/mL   CK W/ CKMB & INDEX    Collection Time: 01/03/17 11:40 AM   Result Value Ref Range     26 - 192 U/L    CK - MB 18.0 (H) <3.6 NG/ML    CK-MB Index 11.6 (H) 0 - 2.5     EKG, 12 LEAD, INITIAL    Collection Time: 01/03/17 11:45 AM   Result Value Ref Range    Ventricular Rate 75 BPM    Atrial Rate 75 BPM    P-R Interval 164 ms    QRS Duration 68 ms    Q-T Interval 380 ms    QTC Calculation (Bezet) 424 ms    Calculated P Axis 86 degrees    Calculated R Axis 59 degrees    Calculated T Axis 89 degrees    Diagnosis       Sinus rhythm with premature atrial complexes  Nonspecific T wave abnormality    No previous ECGs available  Confirmed by Lian Woodward ([id number]) on 1/3/2017 2:42:10 PM     MAGNESIUM    Collection Time: 01/03/17 12:40 PM   Result Value Ref Range    Magnesium 2.0 1.6 - 2.4 mg/dL   METABOLIC PANEL, COMPREHENSIVE    Collection Time: 01/03/17 12:40 PM   Result Value Ref Range    Sodium 144 136 - 145 mmol/L    Potassium 4.4 3.5 - 5.1 mmol/L    Chloride 114 (H) 97 - 108 mmol/L    CO2 23 21 - 32 mmol/L    Anion gap 7 5 - 15 mmol/L    Glucose 148 (H) 65 - 100 mg/dL    BUN 16 6 - 20 MG/DL    Creatinine 0.79 0.55 - 1.02 MG/DL    BUN/Creatinine ratio 20 12 - 20      GFR est AA >60 >60 ml/min/1.73m2    GFR est non-AA >60 >60 ml/min/1.73m2    Calcium 7.8 (L) 8.5 - 10.1 MG/DL    Bilirubin, total 0.4 0.2 - 1.0 MG/DL    ALT 40 12 - 78 U/L    AST 51 (H) 15 - 37 U/L    Alk.  phosphatase 50 45 - 117 U/L    Protein, total 6.2 (L) 6.4 - 8.2 g/dL Albumin 3.2 (L) 3.5 - 5.0 g/dL    Globulin 3.0 2.0 - 4.0 g/dL    A-G Ratio 1.1 1.1 - 2.2         IMAGING RESULTS:  Study Result      EXAM: XR CHEST PA LAT     INDICATION: Chest pain today.     COMPARISON: None     TECHNIQUE: PA and lateral chest views     FINDINGS: Cardiac monitoring wires overlie the thorax. There is aortic  atherosclerosis. The cardiomediastinal and hilar contours are within normal  limits. The pulmonary vasculature is within normal limits.      The lungs are hyperinflated with mild lingular airspace opacity. There is trace  effusions versus blunting of the costophrenic angles. There is no pneumothorax. The visualized bones and upper abdomen are age-appropriate.     IMPRESSION  IMPRESSION:     Hyperinflated lungs are suggestive of COPD. There is mild lingular airspace  opacity that may represent atelectasis or infection. Trace pleural effusions  versus pleural thickening. MEDICATIONS GIVEN:  Medications   heparin 25,000 units in D5W 250 ml infusion (600 Units/hr IntraVENous Continued On Admission 1/3/17 3942)   heparin (porcine) injection 1,800 Units (not administered)   heparin (porcine) injection 900 Units (not administered)   albuterol (PROVENTIL HFA, VENTOLIN HFA, PROAIR HFA) inhaler 2 Puff (not administered)   albuterol (PROVENTIL VENTOLIN) nebulizer solution 2.5 mg (not administered)   aspirin delayed-release tablet 81 mg (not administered)   . PHARMACY TO SUBSTITUTE PER PROTOCOL (not administered)   umeclidinium (INCRUSE ELLIPTA) 62.5 mcg/actuation (not administered)   nitroglycerin (NITROBID) 2 % ointment 1 Inch (1 Inch Topical Given 1/3/17 1314)   metoprolol tartrate (LOPRESSOR) tablet 25 mg (25 mg Oral Given 1/3/17 1314)   fentaNYL citrate (PF) injection 25-50 mcg (25 mcg IntraVENous Given 1/3/17 1631)   iopamidol (ISOVUE-370) 76 % injection 100-150 mL (120 mL IntraVENous Given by Provider 1/3/17 1607)   iopamidol (ISOVUE-370) 76 % injection 0-100 mL (24 mL IntraVENous Given by Provider 1/3/17 1524)   lidocaine (XYLOCAINE) 10 mg/mL (1 %) injection 1-20 mL (9 mL IntraDERMal Given by Provider 1/3/17 1519)   midazolam (VERSED) injection 0.5-2 mg (1 mg IntraVENous Given 1/3/17 1631)   0.9% sodium chloride infusion (100 mL/hr IntraVENous Continued On Admission 1/3/17 1456)   levothyroxine (SYNTHROID) tablet 50 mcg (not administered)   levothyroxine (SYNTHROID) tablet 75 mcg (not administered)   atorvastatin (LIPITOR) tablet 40 mg (not administered)   nitroglycerin 100 mcg/ml compounded injection (not administered)   heparin (porcine) 1,000 unit/mL injection 1,000-10,000 Units (1,500 Units IntraVENous Given 1/3/17 1636)   nitroglycerin 1 mg/10mL injection (not administered)   verapamil (ISOPTIN) 2.5 mg/mL injection (not administered)   nitroglycerin 1 mg/10mL injection (not administered)   albuterol (PROVENTIL VENTOLIN) nebulizer solution 5 mg (5 mg Nebulization Given 1/3/17 1250)   ipratropium (ATROVENT) 0.02 % nebulizer solution 0.5 mg (0.5 mg Nebulization Given 1/3/17 1250)   heparinized saline 2 units/mL infusion 1,000 Units (1,000 Units Irrigation Given by Provider 1/3/17 1517)   heparinized saline 2 units/mL infusion 1,000 Units (1,000 Units Irrigation Given by Provider 1/3/17 1517)   heparinized saline 2 units/mL infusion 1,000 Units (1,000 Units Irrigation Given by Provider 1/3/17 1516)   0.9% sodium chloride 1,000 mL with olive oil lubricant (ROTAGLIDE) 20 mL, heparin (porcine) 20,000 Units, verapamil (ISOPTIN) 10 mg, nitroglycerin (TRIDIL) 4,000 mcg (50 mL/hr IntraarTERial Given by Provider 1/3/17 1629)       IMPRESSION:  1. Unstable angina (HCC)        PLAN:  1. Plan to Admit  Admit Note:  1:08 PM  Patient is being admitted to the hospital. The results of their tests and reasons for their admission have been discussed with the patient and/or available family. They convey their agreement and understanding for the need to be admitted and for their admission diagnosis.    Written by Shagufta Joya ED Scribe, as dictated by Josiane Cheng MD.      Attestation: This is note is prepared by Shagufta Joya, acting as Scribe for MD Josiane Chi MD The scribe's documentation has been prepared under my direction and personally reviewed by me in its entirety. I confirm that the note above accurately reflects all work, treatment, procedures, and medical decision making performed by me.

## 2017-01-03 NOTE — ED NOTES
Spoke with Tabitha Muñoz in pharmacy regarding patient Heparin drip.  Patient arrives with Heparin drip infusing via 20 gauge in L FA at 540 ml/hr

## 2017-01-03 NOTE — ED NOTES
TRANSFER - OUT REPORT:    Verbal report given to [employer] (name) on [address]   being transferred to [facility] (unit) for routine progression of care       Report consisted of patients Situation, Background, Assessment and   Recommendations(SBAR). Information from the following report(s) SBAR was reviewed with the receiving nurse. Lines:   Peripheral IV 01/03/17 Right Forearm (Active)   Site Assessment Clean, dry, & intact 1/3/2017 11:35 AM   Phlebitis Assessment 0 1/3/2017 11:35 AM   Infiltration Assessment 0 1/3/2017 11:35 AM   Dressing Status Clean, dry, & intact 1/3/2017 11:35 AM       Peripheral IV 01/03/17 Left Forearm (Active)   Site Assessment Clean, dry, & intact 1/3/2017 11:35 AM   Phlebitis Assessment 0 1/3/2017 11:35 AM   Infiltration Assessment 0 1/3/2017 11:35 AM   Dressing Status Clean, dry, & intact 1/3/2017 11:35 AM        Opportunity for questions and clarification was provided.

## 2017-01-03 NOTE — ED NOTES
Cardiology Dr Gonzalez Plant bedside evaluating patient and speaking with patient regarding cardiac cath procedure.

## 2017-01-03 NOTE — ED NOTES
Pt's son has arrived in ED. Pt's son sent to CCU waiting room Spoke with Jourdan Lu in [facility].

## 2017-01-03 NOTE — PROGRESS NOTES
TRANSFER - OUT REPORT:    Verbal report given to MARIA VICTORIA Strange(name) on [address] [facility]  being transferred to CCU(unit) for routine post - op       Report consisted of patients Situation, Background, Assessment and   Recommendations(SBAR). Information from the following report(s) SBAR, Procedure Summary, MAR and Recent Results was reviewed with the receiving nurse. Lines:   Peripheral IV 01/03/17 Right Forearm (Active)   Site Assessment Clean, dry, & intact 1/3/2017 11:35 AM   Phlebitis Assessment 0 1/3/2017 11:35 AM   Infiltration Assessment 0 1/3/2017 11:35 AM   Dressing Status Clean, dry, & intact 1/3/2017 11:35 AM       Peripheral IV 01/03/17 Left Forearm (Active)   Site Assessment Clean, dry, & intact 1/3/2017 11:35 AM   Phlebitis Assessment 0 1/3/2017 11:35 AM   Infiltration Assessment 0 1/3/2017 11:35 AM   Dressing Status Clean, dry, & intact 1/3/2017 11:35 AM        Opportunity for questions and clarification was provided.       Patient transported with:   Registered Nurse  Tech

## 2017-01-03 NOTE — PROGRESS NOTES
TRANSFER - IN REPORT:    Verbal report received from Elizabeth(name) on [address]  being received from ER(unit) for routine progression of care      Report consisted of patients Situation, Background, Assessment and   Recommendations(SBAR). Information from the following report(s) SBAR, ED Summary, [facility] and Recent Results was reviewed with the receiving nurse. Opportunity for questions and clarification was provided. Assessment completed upon patients arrival to unit and care assumed.

## 2017-01-03 NOTE — PROGRESS NOTES
Pharmacy Medication Reconciliation     The patient was interviewed regarding current PTA medication list, use and drug allergies; Patient present in room and obtained permission from patient to discuss drug regimen with visitor(s) present. The patient was questioned regarding use of any other inhalers, topical products, over the counter medications, herbal medications, vitamin products or ophthalmic/nasal/otic medication use. Allergy Update: No update    Recommendations/Findings: The following amendments were made to the patient's active medication list on file at [facility]:   1) Additions: Varenicline (Chantix) 0.5 mg tablet (Patient weaning off, takes MWF)    2) Deletions: nasacort    3) Changes: levothyroxine regimen      -Clarified PTA med list with Patient, pill bottles. PTA medication list was corrected to the following:     Prior to Admission Medications   Prescriptions Last Dose Informant Patient Reported? Taking? albuterol (PROVENTIL HFA, VENTOLIN HFA, PROAIR HFA) 90 mcg/actuation inhaler 2016 at Unknown time  Yes Yes   Sig: Take 2 Puffs by inhalation every four (4) hours as needed for Wheezing or Shortness of Breath. albuterol (PROVENTIL VENTOLIN) 2.5 mg /3 mL (0.083 %) nebulizer solution 2016 at Unknown time  Yes Yes   Si.5 mg by Nebulization route every four (4) hours as needed for Wheezing. aspirin delayed-release 81 mg tablet 2017 at Unknown time  Yes Yes   Sig: Take 81 mg by mouth daily   diltiazem hcl (CARDIZEM) 120 mg tablet 2017 at Unknown time  Yes Yes   Sig: Take 120 mg by mouth daily (after dinner). ergocalciferol (VITAMIN D2) 50,000 unit capsule 16  Yes No   Sig: Take 50,000 Units by mouth. Every 3 weeks   fluticasone-salmeterol (ADVAIR) 250-50 mcg/dose diskus inhaler 2017 at Unknown time  Yes Yes   Sig: Take 1 Puff by inhalation every twelve (12) hours.    levothyroxine (SYNTHROID) 50 mcg tablet 17  Yes Yes   Sig: Take 50 mcg by mouth every Tuesday, Thursday, Saturday & Sunday. levothyroxine (SYNTHROID) 75 mcg tablet 1/2/2017 at Unknown time  Yes Yes   Sig: Take 75 mcg by mouth every Monday, Wednesday, Friday. pravastatin (PRAVACHOL) 40 mg tablet 1/2/2017 at Unknown time  Yes Yes   Sig: Take 40 mg by mouth nightly. tiotropium (SPIRIVA) 18 mcg inhalation capsule 1/2/2017 at Unknown time  Yes Yes   Sig: Take 1 Cap by inhalation daily (after dinner). varenicline (CHANTIX) 0.5 mg tablet 1/2/2017 at Unknown time  Yes Yes   Sig: Take 0.5 mg by mouth every Monday, Wednesday, Friday.       Facility-Administered Medications: None          Thank you,  Trell Blanca, PHARMD

## 2017-01-03 NOTE — PROGRESS NOTES
Pharmacy Monitor of Heparin    Pharmacy monitoring heparin for this  66 y.o. female ordered heparin for ACS/NSTEMI transfer from [facility] with existing Heparin gtt infusion at 18un/kg/hr (approx 540units per hour) which was discontinued POA to [address] in order to transition to [address] IV pump. Impression/Plan: Stat aPTT ordered and will initiate [facility] Heparin infusion at 12un/kg/hr protocol until aPTT results, without bolus. .  Reviewed w/ nurse Alejandra Recinos who entered Heparin order set protocol orders. We will transition to [address] Heparin gtt now and check [address] aPTT when resulted to assure aPTT is not supratherapeutic; Alejandra Recinos indicates she is running aPTT stat. APTT q6h already ordered.      Thank you,  Lissette Nazario, [facility]

## 2017-01-04 NOTE — PROGRESS NOTES
Interdisciplinary team rounds were held 1/4/2017 with the following team members:Cardiac Rehab and Wellness, Diabetes Treatment Specialist, Nursing, Nutrition, Pharmacy, Physical Therapy. Physician and Respiratory therapy. Plan of care discussed. Goal: Discontinue sheaths, attempt SBT, continue to monitor and support. See MD orders and progress notes for further  interventions and desired outcomes.

## 2017-01-04 NOTE — PROGRESS NOTES
Dr. Shayy White in to see patient. Transvenous pacer wire removed by Dr. Shayy White. Ok to have cath lab to discontinue right groins sheaths. 3132 Cath lab in to pull sheaths. 1000 Dressing on right groin D and I. Ecchymotic areas noted below dressing, but all skin areas soft with no hematoma. Right DP and PT pulses audible with doppler. 1214 Patient drowsy, but opening eyes spontaneously and following commands. Patient placed on SBT at this time. 1234 RR 20. O2 sat 94%  1252 ABG results reviewed by Dr. Mary Alice Serna. Orders placed to extubate patient . 1255 Patient extubated and placed on 5LPM nasal cannula. 1300 RR 24 and O2 sat 92%. Dr. Mary Alice Serna in to see patient. 1303 Wrist restraints removed at this time. 1450 Patient complains of feeling like she needs her inhaler. Respiratory notified and patient able to receive breathing treatment. 1452 Respiratory therapist in to see patient. 1538 Patient able to turn over and right groin site remains D and I. Right leg restraint removed at this time. 1549 Patient complains of feeling like she cant breathe and becoming anxious. Patient encouraged to take  breaths through her and to take slower breaths. O2 sat 94% with RR 24.   1607 Patient temp axillary 101.2. Temperature taken orally at this time and temp 102.5. Dr. Mary Alice Serna notified and orders received. 1658 Patient complained of difficulty breathing. Respiratory in to see patient and gave patient breathing treatment at this time. 1715 Patient breathing easier after breathing treatment. 1725 Patient heart rate 113-121. Dr. Shayy White notified and ok to give metoprolol po at this time. Metoprolol 12.5mg po given at this time. 1810 Patient breathing easier and HR 90's NSR. Patient awake and visiting with family members. Patient denies any discomfort or distress at this time. 1900 Bedside report to Cosme Yusuf RN. Right groin dressing dry and intact with no hematoma noted.

## 2017-01-04 NOTE — PROGRESS NOTES
..Primary Nurse Nicole Johansen RN and Taiwo Lantigua RN performed a dual skin assessment on this patient Mohsen score is 18. Patient with no skin issues. Right groin sheath and TVP site oozing.

## 2017-01-04 NOTE — PROGRESS NOTES
SHEATH PULL NOTE:    Patient informed of procedure with questions answered with review. Sheath site prepped with Chloraprep swab. 5 fr sheath in RFA / 6 fr sheath in RFV pulled by [facility]. Hand hold and quick clot, with manual compression to site. No bleeding, no hematoma, no pain at site. Hemostasis obtained with hand hold/manual compression at site. Patient tolerated well. No change in status. Handhold for 15 minutes. No change at site. Sterile dressing applied to site. No bleeding, no hematoma, no pain/discomfort at site. Groin instructions provided with review. Continue to monitor procedure site and patient status. *Advised patient to keep head flat and extremity flat to decrease risk of bleeding. *Recommended that patient not drink for ONE HOUR post sheath pull completion. *Recommended that patient not eat for TWO HOURS post sheath pull completion. *Instructed patient on rationale for delay of PO products to decrease risk for aspiration and if additional treatment to procedure site is required. Patient verbalized understanding of instructions with review.

## 2017-01-04 NOTE — PROGRESS NOTES
[id number] Shift summary note. Patient s/p CCL w/ stents to RCA X 3. Patient intubated in CCL for agitation. Patient intermittently agitated, sitting up in bed, attempting to pull at lines and tubes. Patient restrained for her safety. Patient not following commands but purposeful. OGT drain coffee ground evolving to melana drainage. Sheaths oozing bloody drainage. Patient not compliant with restrictions. 0700 Bedside shift change report given to Wilmar Edwards  (oncoming nurse) by Nusrat Soto (offgoing nurse). Report included the following information SBAR, Kardex, ED Summary, Procedure Summary, Intake/Output, MAR, Accordion, Recent Results, Med Rec Status, Cardiac Rhythm Paced and Alarm Parameters .

## 2017-01-04 NOTE — PROCEDURES
Cardiac Cathetherization Note     PreOp Diagnosis:    []       Chest Pain   [x]       Unstable Angina   [x]       Acute Myocardial Infarction / NSTEMI   []       Cardiomyopathy/Heart Failure   []       Abnormal Stress Test   []       Valve Disease   []       Pre-Operative Evaluation   []       Other:      Findings/PostOp Diagnosis:   1. Single vessel CAD involving the RCA  2. Calcified coronaries  3. Preserved LVEF  4.  Elevated LVEDP  5. Inferior hypokinesis  6. Successful rotational atherectomy of the RCA with a 1.75mm angelica  7. Successful PTCA and PCI of the RCA with 3 GURWINDER  8. The patient was intubated for safety concerns    Recommendations:   1. Continue DAPT uninteruppted for 12 months and ASA 81mg indefinitely thereafter   2. Routine post procedure & access site care   3. Continue aggressive medical management and risk factor modification     I have explained the nature of cardiac catheterization and possible percutaneous coronary intervention including risks and benefits of the procedure with the patient which include at least a 1:1000 risk for diagnostic procedure and 1/100 risk for percutaneous intervention. Risks include but are not limited to risk of heart attack, stroke, vascular trauma requiring surgical repair or transfusion, abnormal heart rhythm requiring defibrillation or pacemaker, need for intraaortic balloon pump support, renal dysfunction requiring dialysis, exacerbated gastrointestinal bleeding, allergic response to medications requiring ventilatory support, emergent cardiac surgery and even death. They also understand the need for medical compliance - particularly if stenting is required - mandating continued daily consumption of aspirin and plavix or other antiplatelet therapy. Differences between medicated stent versus bare metal stent reviewed with patient. The patient expresses an understanding and verbally consents.  They also understand plans for either radial or femoral access - with unique risks to both vascular beds including arterial occlusion, vascular trauma, hematoma and need for vascular surgery. I have answered all of their questions regarding the procedure and they are willing to proceed. Procedures: LHC, Cors, Cineflouroscopy, LVgram, ultrasound-guided RCFA access     Indication:  As above    Procedure status: []  Elective  [x] Urgent  [] Emergent    Operators:  Patrick Snyder DO    Assistants:  Dulce Mitchell MD    Access:   []  RIGHT Radial  []  LEFT Radial  [x] RCFA  []  LCFA  [x]  RCFV  []  LCFV    Catheters: 5Fr JR4, JL4     Closure: []  TR Band  []  Angioseal  []  Perclose  [x]  Manual Compression    Tubes/Drains:  [] No tubes or drains remain from this procedure  [x] Other:  6Fr RCFA sheath and 6Fr RCFV sheath with a temporary pacer; both sutured in place    Estimated Blood Loss: Minimal     Specimens: None     Sedation: Moderate conscious sedation with IV fentany, propofol, versed, local anesthesia with 1% lidocaine. This was performed by non-anesthesia personnel and I provided direct supervision to a trained independent observer. Time under moderate sedation: 180  Min    Patient age:  66 y.o. Contrast:    234 cc  [x]  Isovue   []  Visipaque    Fluoro Time:   69.7  Min    Radiation Dose:   0218  mGy    Complications:  [x] None  [] Other:     Patient Condition at the end of the procedure:  [x] Stable  [] Other:      Hemodynamics: Ao: 100/57/76  LV:  100/19    Cors:     Dominance: [x] Right  [] Left  [] Mixed    LM: Large caliber calcifiedvessel with luminal irregularities    LAD: Moderate caliber vessel with luminal irregularities  D1: Small caliber vessel without significant stenosis. D2: Smallcaliber vessel without significant stenosis.      LCX: Moderate caliber calcified vessel with luminal irregularities  OM1: Moderate caliber branching calcified vessel with luminal irregularities    RCA: Moderate caliber, calcified, dominant vessel with a patent ostial.proximal stent with mild ISR and ostial narrowing, a 99% heavily calcified focal mid stenosis, and an 80-90% stenosis at the bifurcation  PDA: Small caliber vessel without significant stenosis. PLB: Small caliber vessel with luminal irregularities    LV angiography:   EF:  60%  Wall motion:  Inferior hypokinesis  MR: none    Indication for PCI:  ACS with significant stenosis of the RCA. Technique:    Lesion #1:  RCA    Anticoagulation:  Heparin was used for anticoagulation. Guiding Catheter:  A 6Fr JR4 guiding catheter was used to engage the RCA, after upsizing the sheath to 6Fr. Guidewire:  A Runthrough guidewire was unable to cross the lesion. Next a Fielder XT with a 1.5 x 6mm OTW balloon was used and able to cross the lesion. The [employer] XT was removed and a RotoFloppy was placed in the distal PDA. Next, a 6Fr sheath was placed in the RCFV and a temporary pacing wire was placed at the RV septum. A 1.75mm angelica was used for a total of 13 passes for a total of 55 seconds at 160,000 RPM.  During this procedure the patient was quite agitated and had to be restrained. Despite restraints she was becoming a danger to herself. Due to her inability to remain still on the table, the RotoBurr, wire, and guide were removed due to patient motion. The decision was made to have her urgently intubated to reduce the danger she posed to herself. Anesthesia was called and she was intubated. Propofol was used for sedation. She was bolused with neosynephrine for spurious hypotension. Once she was stable the procedure continued. The 6Fr JR4 guide was re-engaged. A I'mOK guidewire was advanced with a 1.5 x 6mm OTW balloon. This balloon was inflated in the distal RCA/PDA. Next, a 2.0 x 15mm OTW balloon was used and advanced into the distal RCA and inflated at several points along the length of the RCA at 8 BETHANY. Next, a 6Fr Guideliner was inserted.     Stenting:  Next, a 2.25 x 23mm [employer] Alpine GURWINDER was used and deployed in the distal RCA at 10 BETHANY for 45 seconds. The stent balloon was then used to dilated proximally at 12 BETHANY. Next, a 2.5 x 28mm Xience Alpine GURWINDER was used and deployed, in an overlapping fashion, in the mid RCA at 10 BETHANY for 30 seconds. The stent balloon was used to dilate the overlap. Next, a 2.75 x 33mm Xience Alpine GURWINDER was used and deployed, in an overlapping fashion, in the proximal RCA at 20 BETHANY for 30 seconds. The stent balloon was used to dilate the proximal and distal overlap segments. Post-PCI Angiogram:  Angiogram following PCI showed good results with VINOD 3 flow without evidence of dissection, residual stenosis, or perforation. There is mild plaque shift into the PLB with VINOD 3 flow. The patient tolerated the procedure without any significant hemodynamic instability. Results:  1. Successful rotational atherectomy of the RCA  2. Successful insertion of a temporary pacer   3. Successful PTCA and PCI of the RCA with 3 GURWINDER. Recommendations:  1. Load with plavix 600mg  2. Plavix 75mg daily for at least the next 12 months. 3.  ASA 81mg daily for life.   4.  Keep pacemaker in through the night    Car Bautista MD

## 2017-01-04 NOTE — PROGRESS NOTES
4pm: Pt was noted to have increased temperature. Info came that pt may have aspirated upon intubation. Will check paired blood cx. Follow Up CXR in am.  Place on Empiric zosyn. Tylenol as needed for fevers. 1pm: Pt was re-evaluated. Appears stable. On CPAP with good Vt and normal RR. ABG is 7.35/36/54/19.7 on CPAP. Will move forward with extubation. Discussed with RT and nursing.

## 2017-01-04 NOTE — PROGRESS NOTES
01/04/17 0435   ABCDE Bundle   SBT Safety Screen Passed No   SBT Screen Reason for Failure Agitation   SBT not initiated due to patient agitation and fear of self extubation

## 2017-01-04 NOTE — PROGRESS NOTES
Pharmacy Automatic Renal Dosing Protocol - Antimicrobials    Indication for Antimicrobials: PNA, ?aspiration    Current Regimen of Each Antimicrobial (Start Day & Day of Therapy):  Zosyn 3.375gm IV q6h; start ; Day #1    Significant Cultures:   : Blood: (pending)    CAPD, Hemodialysis or Renal Replacement Therapy: none  Paralysis, amputations, malnutrition: none    Recent Labs      17   0500  17   1240  17   1139   CREA  0.65  0.79   --    BUN  14  16   --    WBC  12.9*   --   15.8*     Temp (24hrs), Av.7 °F (37.1 °C), Min:97.4 °F (36.3 °C), Max:102.5 °F (39.2 °C)    Creatinine Clearance (Creatinine Clearance (ml/min)): 37    Impression/Plan: Change Zosyn to 2.25gm IV q6h which is appropriate for indication and renal function       Pharmacy will follow daily and adjust as appropriate.     Thank you,  MELANIE Mayorga [facility]     Renal Dosing Tables on Pharmweb

## 2017-01-04 NOTE — PROGRESS NOTES
Brief post-op note    Cath; 5Fr via RCFA    Patent Diagonal and LCX/OM stents    Discrete 99% focal stenosis in the mid RCA- calcified with a distal stenosis at the bifurcation    Rotational atherectomy after 6Fr upsize and RCFV temporary pacemaker insertion    1.75mm angelica    The patient would remain still for the procedure and was quite agitated.   Due to concern for her safety during the procedure she was intubated    3 GURWINDER placed in the RCA/PDA    Keep pacer in tonight; remove tomorrow    Load with plavix; concern with weight for effient and COPD with brilinta    Will look to extubate when ready in the morning

## 2017-01-04 NOTE — PROGRESS NOTES
Progress Note      1/4/2017 9:20 AM  NAME: Brenton Baker   MRN:  [id number]   Admit Diagnosis: NSTEMI (non-ST elevated myocardial infarction) [facility])      Problem List:      1. NSTEMI  2. Status post cath with rotational atherectomy and placement of 3 GURWINDER in the RCA  3. Patent diagonal and LCx/OM stents  4. Status post intubation for patient safety during cath  5. Status post temporary pacer via RCFV -- removed 1/4/17  6. UGIB  7. Severe COPD  8. Hyperlipidemia  9. CAD     Assessment/Plan:     1. Pacer removed this morning  2. Attempt to wean vent and extubate if possible  3. Pull sheaths  4. Continue ASA and plavix  5. Continue atorvastatin  6. Hold metoprolol with lower BPs  7. Hold ACEi/ARB with lower BPs  8. Continue synthroid         [x]       High complexity decision making was performed in this patient at high risk for decompensation with multiple organ involvement. Subjective:     Chuckie Nieves intubated and sedated  Discussed with RN events overnight. Review of Systems:    Symptom Y/N Comments  Symptom Y/N Comments   Fever/Chills N   Chest Pain N    Poor Appetite N   Edema N    Cough N   Abdominal Pain N    Sputum N   Joint Pain N    SOB/FERREIRA N   Pruritis/Rash N    Nausea/vomit N   Tolerating PT/OT Y    Diarrhea N   Tolerating Diet Y    Constipation N   Other       Could NOT obtain due to:      Objective:      Physical Exam:    Last 24hrs VS reviewed since prior progress note. Most recent are:    Visit Vitals    BP 98/55    Pulse 94    Temp 98.1 °F (36.7 °C)    Resp 13    Ht 4' 11\" (1.499 m)    Wt 30 kg (66 lb 2.2 oz)    LMP  (Exact Date)    SpO2 100%    BMI 13.36 kg/m2       Intake/Output Summary (Last 24 hours) at 01/04/17 0920  Last data filed at 01/04/17 0800   Gross per 24 hour   Intake           1210.7 ml   Output             1190 ml   Net             20.7 ml        General Appearance: Well developed, well nourished, alert, no acute distress.   Ears/Nose/Mouth/Throat: Hearing grossly normal.  Neck: Supple. Chest: Occasional wheeze  Cardiovascular: Regular rate and rhythm, S1S2 normal, no murmur. Abdomen: Soft, non-tender, bowel sounds are active. Extremities: No edema bilaterally. Skin: Warm and dry. [x]         Post-cath site without hematoma, bruit, tenderness, or thrill. Distal pulses intact.     PMH/ reviewed - no change compared to H&P    Data Review    Telemetry: sinus rhythm     EKG:   []  No new EKG for review    Lab Data Personally Reviewed:    Recent Labs      01/04/17   0500  01/03/17   1139   WBC  12.9*  15.8*   HGB  11.5  12.2   HCT  34.5*  36.5   PLT  255  280     Recent Labs      01/04/17   0500  01/03/17   1139   APTT  26.6  67.5*      Recent Labs      01/04/17   0500  01/03/17   1240   NA  145  144   K  4.2  4.4   CL  114*  114*   CO2  20*  23   BUN  14  16   CREA  0.65  0.79   GLU  96  148*   CA  8.0*  7.8*   MG  2.0  2.0     Recent Labs      01/03/17   1140   CPK  155   CKNDX  11.6*   TROIQ  1.39*     No results found for: CHOL, CHOLX, CHLST, CHOLV, HDL, LDL, DLDL, LDLC, DLDLP, TGL, TGLX, TRIGL, TRIGP, CHHD, CHHDX    Recent Labs      01/03/17   1240   SGOT  51*   AP  50   TP  6.2*   ALB  3.2*   GLOB  3.0     Recent Labs      01/04/17   0535  01/03/17   2340   PH  7.31*  7.33*   PCO2  41  34*   PO2  78*  512*       Medications Personally Reviewed:    Current Facility-Administered Medications   Medication Dose Route Frequency    fentaNYL (PF) 900 mcg/30 ml infusion soln   mcg/hr IntraVENous TITRATE    propofol (DIPRIVAN) infusion  5-50 mcg/kg/min IntraVENous TITRATE    ipratropium (ATROVENT) 0.02 % nebulizer solution 0.5 mg  0.5 mg Nebulization QID RT    pantoprazole (PROTONIX) 40 mg in sodium chloride 0.9 % 10 mL injection  40 mg IntraVENous Q12H    albuterol (PROVENTIL VENTOLIN) nebulizer solution 2.5 mg  2.5 mg Nebulization Q4H PRN    aspirin delayed-release tablet 81 mg  81 mg Oral DAILY    fluticasone-vilanterol (BREO ELLIPTA) 100mcg-25mcg/puff 1 Puff Inhalation DAILY    umeclidinium (INCRUSE ELLIPTA) 62.5 mcg/actuation  1 Puff Inhalation DAILY    0.9% sodium chloride infusion  50 mL/hr IntraVENous CONTINUOUS    [START ON 1/5/2017] levothyroxine (SYNTHROID) tablet 50 mcg  50 mcg Oral EVERY TUES,THUR,SAT,SUN    levothyroxine (SYNTHROID) tablet 75 mcg  75 mcg Oral Q MON, WED & FRI    atorvastatin (LIPITOR) tablet 40 mg  40 mg Oral QHS    eptifibatide (INTEGRILIN) 0.75 mg/mL infusion  1 mcg/kg/min IntraVENous CONTINUOUS    clopidogrel (PLAVIX) tablet 75 mg  75 mg Oral DAILY    metoprolol tartrate (LOPRESSOR) tablet 12.5 mg  12.5 mg Oral Q12H    mupirocin (BACTROBAN) 2 % ointment   Both Nostrils BID    chlorhexidine (PERIDEX) 0.12 % mouthwash 15 mL  15 mL Oral Q12H         Xiomara Holbrook MD

## 2017-01-04 NOTE — CARDIO/PULMONARY
C/P Rehab Note:    Chart Reviewed. Admitting diagnosis of NSTEMI S/p PCI with stents. Status post temporary pacer via RCFV -- removed 1/4/17. Pt started on plavix    Pt with severe COPD currently intubated. PMH significant for:  1. MI in 2010 with stent  2. Bipolar Dz  3. Hyperlipidemia    Pt is a current day smoker. Will continue to follow.

## 2017-01-04 NOTE — PROGRESS NOTES
[facility] Consult Service Progress NOTE  Pulmonary, Critical Care, and Sleep Medicine    Name: Catarina Rhodes MRN: [id number]   : 1945 Hospital: [facility]   Date: 2017  Admission Date: 1/3/2017     Chart and notes reviewed. Data reviewed. Pt seen on rounds earlier today. I have evaluated and examined the patient. Pt is acutely ill. Reviewed the evaluation and will assist in implementing the plan as outlined by Dr. Tyler Schulte and team    Catarina Rhodes is a 66 y.o. female with PMhx significant for stents, MI (), and COPD who presented 1/3/17 via EMS from [address] [place] [address] to [facility] ED with cc of chest tightness and chest pressure since 400. Pt sx radiated into her left arm. She described this a tingling sensation. She took Aspirin with no relief. She noted being anxious and under a lot of stress during the holidays, stating she is unsure if this is related to her sx. She reports additional sx of a productive cough and SOB, noting this at baseline due to her COPD. She takes Cardizem and baby Aspirin daily. She has a history of heart disease, and she has been treated at [facility]. Pt seen by Dr. Chance Coronel and taken emergently aleksander the cath lab for NSTEMI. She was intubated for the procedure. Cath showed patent Diagonal and LCX/OM stents. There was a discrete 99% focal stenosis in the mid RCA- calcified with a distal stenosis at the bifurcation. Rotational atherectomy after 6Fr upsize and RCFV temporary pacemaker insertion performed and GURWINDER placed in the RCA/PDA. Pt now in the CCU with a temporary pacemaker on the vent and sedated. She is restless when lightened. Early AM pt had copious coffee ground NG output. Pt is a chronic smoker, 1-2 packs a day and has a longstanding history of severe COPD. She is quite short of breath from this.  She uses a number of inhalers for this    SOCIAL HISTORY: She [place]. 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 Smokes 1-2 packs per day for 50 years. FAMILY HISTORY: Negative for heart disease  PCP: Lindell Schaumann, MD      Hospital Day: 2      IMPRESSION:   · Respiratory infficiency on the ventilatory  · Acute non-ST elevation myocardial infarction. · UGIB likely from stress gastritis- initiall Hgb suggests some chronic hypoxia at home  · Acute unstable angina pectoris, class 4.   · Coronary artery atherosclerosis. · Status post remote myocardial infarction in . · Status post remote percutaneous coronary intervention with unknown coronary artery in [facility] in . · Severe chronic obstructive pulmonary disease. apparently not on home O2 but on multiple inhalers  · Hyperlipidemia. RECOMMENDATIONS/PLAN:   · CCU  · Pacemaker per Cardiology  · Prepare for SAT, SBT  · PPI  · Will need to assess O2 needs prior to discharge  · Smoking cessation counseling when able  · DVT prophylaxis when able  · Will be available to assist in medical management while in the CCU pending disposition     Code: No Order        Hemodynamics:   PAP:   CO:     Wedge:   CI:     CVP:    SVR:       PVR:         Ventilator Settings:      Mode Rate TV Press PEEP FiO2 PIP Min.  Vent   Assist control    400 ml    5 cm H20 40 %  30 cm H2O  6.02 l/min        Vital Signs: Intake/Output: Intake/Output:   Visit Vitals    /70    Pulse 86    Temp 97.4 °F (36.3 °C)    Resp 14    Ht 4' 11\" (1.499 m)    Wt 30 kg (66 lb 2.2 oz)    LMP  (Exact Date)    SpO2 99%    BMI 13.36 kg/m2      O2 Device: Endotracheal tube, Ventilator     Temp (24hrs), Av.7 °F (36.5 °C), Min:97.4 °F (36.3 °C), Max:98.4 °F (36.9 °C)     Intake/Output Summary (Last 24 hours) at 17 0739  Last data filed at 17 0639   Gross per 24 hour   Intake           1052.6 ml   Output             1130 ml   Net            -77.4 ml Last shift:         Last 3 shifts: 01/02 1901 - 01/04 0700  In: 1052.6 [I.V.:1052.6]  Out: 1130 [Urine:1130]         Telemetry:    normal sinus rhythm    Physical Exam:    General: petite WF on vent non-toxic, severely ill and cachectic   HEENT: intubated; no thrush   Neck: No abnormally enlarged lymph nodes.    Chest: petite, nomi   Lungs: decreased air exchange bilaterally   Heart: Regular rate and rhythm or S1S2 present   Abdomen: soft, non-tender, without masses or organomegaly   : Box   Extremity: negative, cyanosis, clubbing;    Neuro: sedated   Psych: sedated and cannot assess   Skin: Skin unremarkable;   Pulses: Bilateral, Radial, 2+ Normal upper and lower extremity pulses   Capillary refill: normal well perfused;      DATA:    MAR reviewed and pertinent medications noted or modified as needed    MEDS:   Current Facility-Administered Medications   Medication    fentaNYL (PF) 900 mcg/30 ml infusion soln    propofol (DIPRIVAN) infusion    albuterol (PROVENTIL VENTOLIN) nebulizer solution 2.5 mg    aspirin delayed-release tablet 81 mg    fluticasone-vilanterol (BREO ELLIPTA) 100mcg-25mcg/puff    umeclidinium (INCRUSE ELLIPTA) 62.5 mcg/actuation    0.9% sodium chloride infusion    [START ON 1/5/2017] levothyroxine (SYNTHROID) tablet 50 mcg    levothyroxine (SYNTHROID) tablet 75 mcg    atorvastatin (LIPITOR) tablet 40 mg    nitroglycerin 1 mg/10mL injection    verapamil (ISOPTIN) 2.5 mg/mL injection    nitroglycerin 1 mg/10mL injection    succinylcholine (ANECTINE) 20 mg/mL injection    eptifibatide (INTEGRILIN) 0.75 mg/mL infusion    clopidogrel (PLAVIX) tablet 75 mg    metoprolol tartrate (LOPRESSOR) tablet 12.5 mg    PHENYLephrine (NEOSYNEPHRINE) 10 mg/mL injection    0.9% sodium chloride infusion    mupirocin (BACTROBAN) 2 % ointment    chlorhexidine (PERIDEX) 0.12 % mouthwash 15 mL          Labs:  Recent Labs      01/04/17   0500  01/03/17   1139   WBC  12.9*  15.8*   HGB  11.5  12.2 PLT  255  280   APTT  26.6  67.5*     Recent Labs      01/04/17   0500  01/03/17   1240   NA  145  144   K  4.2  4.4   CL  114*  114*   CO2  20*  23   GLU  96  148*   BUN  14  16   CREA  0.65  0.79   CA  8.0*  7.8*   MG  2.0  2.0   ALB   --   3.2*   SGOT   --   51*   ALT   --   40     Recent Labs      01/04/17   0535  01/03/17   2340   PH  7.31*  7.33*   PCO2  41  34*   PO2  78*  512*   HCO3  20*  18*   FIO2  40  100     Recent Labs      01/04/17   0535  01/03/17   2340   PH  7.31*  7.33*   PCO2  41  34*   PO2  78*  512*   HCO3  20*  18*   FIO2  40  100     Recent Labs      01/03/17   1140   CPK  155   CKNDX  11.6*   TROIQ  1.39*       Imaging:  []                           I have personally reviewed the patients radiographs and reports:        Results from [place] encounter on 01/03/17   XR CHEST PORT   Narrative EXAM:  XR CHEST PORT. INDICATION: Confirm OG tube placement. COMPARISON: 1/3/2017. FINDINGS:   A portable AP radiograph of the chest was obtained at 1940 hours. Lines and tubes: The patient is on a cardiac monitor. There is an endotracheal  tube in satisfactory position and a tube coursing through the esophagus with  sidehole over the gastric fundus. There is a pacemaker in the inferior vena  cava. Lungs: The lungs are clear. Pleura: There is no pneumothorax or pleural effusion. Mediastinum: The cardiac and mediastinal contours and pulmonary vascularity are  normal. The aorta is atherosclerotic. Bones and soft tissues: The bones and soft tissues are grossly within normal  limits. There is contrast material in the renal collecting systems. Impression IMPRESSION: Orogastric tube over the stomach. No results found for this or any previous visit.     Cheyenne Orr MD

## 2017-01-05 NOTE — PROGRESS NOTES
CM aware of staff concerns about pt living alone and dispo - will research and full assessment to follow on 1/6.   Bc Genao, MSW

## 2017-01-05 NOTE — PROGRESS NOTES
[facility] Consult Service Progress NOTE  Pulmonary, Critical Care, and Sleep Medicine    Name: Pablo Jacobs MRN: [id number]   : 1945 Hospital: [facility]   Date: 2017  Admission Date: 1/3/2017     Chart and notes reviewed. Data reviewed. Pt seen on rounds earlier today. I have evaluated and examined the patient. Pt is acutely ill. Reviewed the evaluation and will assist in implementing the plan as outlined by Dr. Mayela Paulson and team    Pablo Jacobs is a 66 y.o. female with PMhx significant for stents, MI (), and COPD who presented 1/3/17 via EMS from [facility] to [facility] ED with cc of chest tightness and chest pressure since 0. Pt sx radiated into her left arm. She described this a tingling sensation. She took Aspirin with no relief. She noted being anxious and under a lot of stress during the holidays, stating she is unsure if this is related to her sx. She reports additional sx of a productive cough and SOB, noting this at baseline due to her COPD. She takes Cardizem and baby Aspirin daily. She has a history of heart disease, and she has been treated at [facility]. Pt seen by Dr. Pierce Victoria and taken emergently aleksander the cath lab for NSTEMI. She was intubated for the procedure. Cath showed patent Diagonal and LCX/OM stents. There was a discrete 99% focal stenosis in the mid RCA- calcified with a distal stenosis at the bifurcation. Rotational atherectomy after 6Fr upsize and RCFV temporary pacemaker insertion performed and GURWINDER placed in the RCA/PDA. Pt now in the CCU with a temporary pacemaker on the vent and sedated. She is restless when lightened. Early AM pt had copious coffee ground NG output. Pt is a chronic smoker, 1-2 packs a day and has a longstanding history of severe COPD. She is quite short of breath from this.  She uses a number of inhalers for this    SOCIAL HISTORY: She lives [place] own [place]. 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 Smokes 1-2 packs per day for 50 years. FAMILY HISTORY: Negative for heart disease  PCP: Cy Gaona MD      Hospital Day: 3     extubated on ; severe FERREIRA. Has been independent living alone [place] the woods in [address]. Son lives in [facility]; apprehensive about moving. No loca l support. Does own shopping. Has nebs at home. IMPRESSION:   · Respiratory infficiency extubated  · Severe chronic obstructive pulmonary disease. apparently not on home O2 but on multiple inhalers  · Acute non-ST elevation myocardial infarction  · Fever  · LLL atelectasis. · UGIB likely from stress gastritis- initiall Hgb suggests some chronic hypoxia at home  · Acute unstable angina pectoris, class 4.   · Coronary artery atherosclerosis. · Status post remote myocardial infarction in . · Status post remote percutaneous coronary intervention with unknown coronary artery in [facility] in . · Hyperlipidemia.       RECOMMENDATIONS/PLAN:   · CCU- no reserve  · Mobilize  · If she can rally would suggest inpt Pulmonary rehab  · BIPAP prn  · CXR in AM- LLL atelectasis  · Empiric abx for possible aspiration  · Pulmonary toilet  · Pacemaker per Cardiology  · Prepare for SAT, SBT  · PPI  · Will need to assess O2 needs prior to discharge  · Smoking cessation counseling when able  · DVT prophylaxis when able  · Will be available to assist in medical management while in the CCU pending disposition     Code: Full Code          Vital Signs: Intake/Output: Intake/Output:   Visit Vitals    BP 96/52    Pulse (!) 108    Temp 99.1 °F (37.3 °C)    Resp 21    Ht 4' 11\" (1.499 m)    Wt 30 kg (66 lb 2.2 oz)    LMP  (Exact Date)    SpO2 97%    BMI 13.36 kg/m2      O2 Device: Nasal cannula  O2 Flow Rate (L/min): 4 l/min  Temp (24hrs), Av.7 °F (37.6 °C), Min:98.9 °F (37.2 °C), Max:102.5 °F (39.2 °C)     Intake/Output Summary (Last 24 hours) at 01/05/17 0818  Last data filed at 01/05/17 0800   Gross per 24 hour   Intake          1569.96 ml   Output             1777 ml   Net          -207.04 ml    Last shift:      01/05 0701 - 01/05 1900  In: 50 [I.V.:50]  Out: 400 [Urine:400]  Last 3 shifts: 01/03 1901 - 01/05 0700  In: 2730.7 [I.V.:2620.7]  Out: 1249 [Urine:2567]         Telemetry:    normal sinus rhythm    Physical Exam:    General: petite WF SOB; non-toxic, ill and cachectic   HEENT: dry; no thrush   Neck: No abnormally enlarged lymph nodes.    Chest: petite, nomi   Lungs: decreased air exchange bilaterally   Heart: Regular rate and rhythm or S1S2 present   Abdomen: soft, non-tender, without masses or organomegaly   : Box   Extremity: negative, cyanosis, clubbing;    Neuro: alert   Psych: anxious   Skin: Skin unremarkable;   Pulses: Bilateral, Radial, 2+ Normal upper and lower extremity pulses   Capillary refill: normal well perfused;      DATA:    MAR reviewed and pertinent medications noted or modified as needed    MEDS:   Current Facility-Administered Medications   Medication    nitroglycerin (NITROBID) 2 % ointment    atorvastatin (LIPITOR) tablet 40 mg    0.9% sodium chloride infusion    sodium chloride (NS) flush 5-10 mL    sodium chloride (NS) flush 5-10 mL    LORazepam (ATIVAN) tablet 1 mg    pantoprazole (PROTONIX) 40 mg in sodium chloride 0.9 % 10 mL injection    aspirin chewable tablet 81 mg    albuterol-ipratropium (DUO-NEB) 2.5 MG-0.5 MG/3 ML    acetaminophen (TYLENOL) tablet 1,000 mg    Or    acetaminophen (OFIRMEV) infusion 1,000 mg    piperacillin-tazobactam (ZOSYN) 2.25 g in 0.9% sodium chloride (MBP/ADV) 50 mL    fluticasone (FLONASE) 50 mcg/actuation nasal spray 2 Spray    fluticasone-vilanterol (BREO ELLIPTA) 100mcg-25mcg/puff    umeclidinium (INCRUSE ELLIPTA) 62.5 mcg/actuation    0.9% sodium chloride infusion    levothyroxine (SYNTHROID) tablet 50 mcg    levothyroxine (SYNTHROID) tablet 75 mcg    clopidogrel (PLAVIX) tablet 75 mg    metoprolol tartrate (LOPRESSOR) tablet 12.5 mg    mupirocin (BACTROBAN) 2 % ointment          Labs:  Recent Labs      01/04/17   0500  01/03/17   1139   WBC  12.9*  15.8*   HGB  11.5  12.2   PLT  255  280   APTT  26.6  67.5*     Recent Labs      01/04/17   0500  01/03/17   1240   NA  145  144   K  4.2  4.4   CL  114*  114*   CO2  20*  23   GLU  96  148*   BUN  14  16   CREA  0.65  0.79   CA  8.0*  7.8*   MG  2.0  2.0   ALB   --   3.2*   SGOT   --   51*   ALT   --   40     Recent Labs      01/05/17   0632  01/04/17   1240  01/04/17   0535   PH  7.44  7.35  7.31*   PCO2  34*  37  41   PO2  70*  55*  78*   HCO3  22  20*  20*   FIO2  4  40  40     Recent Labs      01/05/17   0632  01/04/17   1240  01/04/17   0535   PH  7.44  7.35  7.31*   PCO2  34*  37  41   PO2  70*  55*  78*   HCO3  22  20*  20*   FIO2  4  40  40     Recent Labs      01/03/17   1140   CPK  155   CKNDX  11.6*   TROIQ  1.39*       Imaging:  []                           I have personally reviewed the patients radiographs and reports:        Results from Hospital Encounter encounter on 01/03/17   XR CHEST PORT   Narrative EXAM:  XR CHEST PORT    INDICATION:  Tube/line placement    COMPARISON:  1/3/2017    FINDINGS: A portable AP radiograph of the chest was obtained at 0439 hours. The  patient is on a cardiac monitor. The heart size is normal. His appetite change  of the aorta. The lungs demonstrate changes consistent with emphysema. There are new opacities  at the left lung base with elevation left hemidiaphragm. This is most consistent  with partial left lower lobe collapse. Superimposed pneumonia would be difficult  to exclude. The ET tube and NG tube have been removed. Impression IMPRESSION:  1. Emphysema  2. Interval development of opacities in left lower lobe most likely due to  atelectasis. Follow-up to resolution is suggested.         No results found for this or any previous visit.     Ronna Huddleston MD

## 2017-01-05 NOTE — PROGRESS NOTES
1900: Bedside and Verbal shift change report given to [id number] (oncoming nurse) by Mayra Saha RN (offgoing nurse). Report included the following information SBAR, Kardex, Procedure Summary, Intake/Output, MAR, Recent Results, Med Rec Status and Cardiac Rhythm NSR.  2000:  Assessment completed, VSS, NSR, 5Ls nasal canula, afebrile. Patient A/Ox4, follows commands, talkative. Lungs diminished, tachypneic. Bowel sounds hypoactive. Box present, draining clear yellow urine. Right groin cath insertion site, bruising noted, no bleeding no hematoma. BLE pulses heard on doppler. PIV infusing 0.9% NaCl at 50 ml/hr. Call bell with in reach, son at bedside. Will continue to monitor. 2045:  Patient c/o \"discomfort\" in left chest.  Patient states it is not the same pain she has felt with previous cardiac issues, feels it might be indigestion. No EKG changes noted, VSS. Dr. Vika villasenor, orders received for GI cocktail. 2145:  Patient refusing all ordered medication (protonix, atorvastatin, GI cocktail). Patient states concern over ability to afford medications and possible adverse side effects. Extensive medication teaching provided, patient agrees to GI cocktail but wishes to hold all other medications at this time. Will continue to monitor. 2245:  Patient resting comfortably at this time, will continue to monitor. 0000:  Reassessment completed, no changes noted. Patient with home medication at bedside, requesting to use medications. Pharmacy notified of need to verify medications. 0400:  Reassessment completed, no changes noted. Will continue to monitor. 0550:  Patient woke up extremely SOB, oxygen sats dropped to 85%, RR 30-40s. -140s. Patient on 6Ls nasal canula. Patient panicked, attempts to calm patient unsuccessful. Patient placed on NRB, patient unable to tolerate mask and removes mask. Dr. Vika villasenor, updated on patient's status.   Orders received for Nitropaste 1 inch and Bumex IV 1 mg. Patient initially refused medication intervention, but agreed to nitropaste and bumex. 8911  Patient continues to feel SOB, anxious. Oxygen sats on 6Ls NC at 97%, RR 24, . Will continue to monitor.

## 2017-01-05 NOTE — WOUND CARE
Pressure Ulcer Prevention In basket Alert Received for Mohsen < 14 (moderate risk).      Suggested Care Plan/Interventions for Nursing  1. Complete Mohsen Pressure Ulcer Risk Scale and use sub scores to identify appropriate interventions. 2. Perform Assessment: skin, changes in LOC, visual cues for pain, monitor skin under medical devices  3. Respond to Reduced Sensory Perception: changes in LOC, check visual cues for pain, float heels, suspension boots, pressure redistribution bed/mattress/chair cushion, turning and reposition approximately every 2 hours (pillows & wedges), pad between skin to skin, turn & reposition  4. Manage Moisture: absorbent under pads, internal / external urinary device, internal /  external fecal device, minimize layers, contain wound drainage, access need for specialty bed, limit adult briefs, maintain skin hydration (lotion/cream), moisture barrier, offer toileting every hour  5. Promote Activity: increase time out of bed, chair cushion, PT/OT evaluation, trapeze to reposition, pressure redistribution bed/mattress/chair  6. Address Reduced Mobility: float heels / suspension boot, HOB 30 degrees or less, pressure redistribution bed/mattress/cushion, PT / OT evaluation, turn and reposition approximately every 2 hours (pillows & wedges)  7. Promote Nutrition: document food / fluid / supplement intake, encourage/assist with meals as needed  8. Reduce Friction and Shear: transferring/repositioning devices (lift/draw sheet), lift team/ patient mobility team, feet elevated on foot rest, minimize layers, foam dressing / transparent film / skin sealants, protective barrier creams and emollients, transfer aides (board, Ceci lift, ceiling lift, stand assist), HOB 30 degrees or less, trapeze to reposition.   Wound Care Team

## 2017-01-05 NOTE — PROGRESS NOTES
Problem: Self Care Deficits Care Plan (Adult)  Goal: *Acute Goals and Plan of Care (Insert Text)  Occupational Therapy Goals  Initiated 1/5/2017  1. Patient will tolerate standing ADL x 5 minutes with supervision/set-up within 7 day(s). 2. Patient will perform lower body dressing with supervision/set-up within 7 day(s). 3. Patient will perform bathing with supervision/set-up within 7 day(s). 4. Patient will perform toilet transfers with supervision/set-up within 7 day(s). 5. Patient will perform all aspects of toileting with supervision/set-up within 7 day(s). 6. Patient will utilize energy conservation techniques during functional activities with verbal cues within 7 day(s). OCCUPATIONAL THERAPY EVALUATION  Patient: Delon Gallego (43 y.o. female)  Date: 1/5/2017  Primary Diagnosis: NSTEMI (non-ST elevated myocardial infarction) [facility])        Precautions:   Fall, Bed Alarm      ASSESSMENT :  Based on the objective data described below, the patient presents with decreased insight into deficits, need for supplemental O2, anxiety, decreased balance, risk for falls and decreased independence in ADLs. At baseline, pt lives alone, reports decrease in activity levels over past months, independent with ADLs and mobility. Pt reports removing her nasal cannula at home as she enjoys using her wood fire stove in the winter and doesn't want to \"risk blowing up. \" Pt was supervision for bed mobility, CGA- min A x 2 via HHA to transfer to chair. Pt donned socks EOB with setup but pt has balance deficits with increased. Recommend RW/cardiac cart next session. O2 stable via 4 L. At this time, pt is unable to care for herself at home and reports increased difficulty managing IADLs. May benefit from [facility] pulmonary program vs SNF pending progress. Patient will benefit from skilled intervention to address the above impairments.   Patients rehabilitation potential is considered to be Fair  Factors which may influence rehabilitation potential include:   [ ]             None noted  [ ]             Mental ability/status  [ ]             Medical condition  [ ]             Home/family situation and support systems  [ ]             Safety awareness  [ ]             Pain tolerance/management  [X]             Other: insight into deficits       PLAN :  Recommendations and Planned Interventions:  [X]               Self Care Training                  [X]        Therapeutic Activities  [X]               Functional Mobility Training    [ ]        Cognitive Retraining  [X]               Therapeutic Exercises           [X]        Endurance Activities  [X]               Balance Training                   [ ]        Neuromuscular Re-Education  [ ]               Visual/Perceptual Training     [X]   Home Safety Training  [X]               Patient Education                 [X]        Family Training/Education  [ ]               Other (comment):     Frequency/Duration: Patient will be followed by occupational therapy 4 times a week to address goals. Discharge Recommendations: [facility] pulmonary vs SNF  Further Equipment Recommendations for Discharge: TBD       SUBJECTIVE:   Patient stated I take my nasal cannula off and I am still smoking.       OBJECTIVE DATA SUMMARY:   HISTORY:   Past Medical History   Diagnosis Date    Adverse effect of anesthesia         difficult to wake x 1    Anxiety      Bipolar 1 disorder (HCC)      Chronic lung disease      Colon polyps 2001, 2006    Constipation      [facility] [address]      Diverticulitis      Emphysema 2013    Gonorrhea 26's    HSV infection      Hypercholesteremia      Hypothyroid      IBS (irritable bowel syndrome)         no formal diagnosis    Menarche         onset at age 12    Menopause         onset at age 45    MI (myocardial infarction) ([facility] [address] 08/2010    Osteopenia      S/P coronary artery stent placement 2010     Past Surgical History   Procedure Laterality Date    Hx colonoscopy   03/01, 03/06, 10/11       q5 yr.    Hx hemorrhoidectomy        Hx gyn           bartholins cyst excised    Hx colposcopy   02/16/2012, 12/2009, 11/2008, 10/20/2005    Hx heart catheterization   08/2010       with stent    Colonoscopy N/A 12/22/2016       COLONOSCOPY with Polypectomy performed by Laine Platt MD at [facility]        Prior Level of Function/Home Situation: lives alone, able to carry 1-2 dry fire logs to her wood burning stove. Pt has not worn her nasal cannula for her COPD since November as she enjoys her supplemental heating stove (has electric heat). Expanded or extensive additional review of patient history:      Home Situation  Home Environment: Private residence  # Steps to Enter: 0  One/Two Story Residence: One story  Living Alone: Yes  Support Systems: Child(je)  Patient Expects to be Discharged to[de-identified] Private residence  Current DME Used/Available at Home: None  Tub or Shower Type: Tub/Shower combination  [X]  Right hand dominant             [ ]  Left hand dominant     EXAMINATION OF PERFORMANCE DEFICITS:  Cognitive/Behavioral Status:  Neurologic State: Alert; Appropriate for age  Orientation Level: Oriented X4  Cognition: Appropriate for age attention/concentration; Impulsive           Skin: bruise R flank, reports from rash  Edema: none noted  Vision/Perceptual:    Tracking: Able to track stimulus in all quadrants w/o difficulty                      Acuity: Within Defined Limits    Corrective Lenses: Glasses  Range of Motion:     AROM: Generally decreased, functional                       Strength:     Strength: Generally decreased, functional              Coordination:  Coordination: Within functional limits  Fine Motor Skills-Upper: Left Intact; Right Intact    Gross Motor Skills-Upper: Left Intact; Right Intact  Tone & Sensation:     Tone: Normal  Sensation: Intact                       Balance:  Sitting: Intact  Standing: Impaired  Standing - Static: Fair  Standing - Dynamic : Fair     Functional Mobility and Transfers for ADLs:  Bed Mobility:  Rolling: Supervision  Supine to Sit: Supervision  Scooting: Supervision     Transfers:  Sit to Stand: Minimum assistance  Stand to Sit: Minimum assistance  Bed to Chair: Minimum assistance  Toilet Transfer : Minimum assistance     ADL Assessment:  Feeding: Independent     Oral Facial Hygiene/Grooming: Contact guard assistance     Bathing: Moderate assistance     Upper Body Dressing: Minimum assistance     Lower Body Dressing: Minimum assistance (standing balance, )     Toileting: Total assistance                 ADL Intervention and task modifications:          Pt educated on role of OT and required verbal cues for safety and proper hand placement during ADLs/functional transfers. Functional Measure:  Barthel Index:      Bathin  Bladder: 0  Bowels: 5  Groomin  Dressin  Feeding: 10  Mobility: 0  Stairs: 0  Toilet Use: 5  Transfer (Bed to Chair and Back): 10  Total: 35         Barthel and G-code impairment scale:  Percentage of impairment CH  0% CI  1-19% CJ  20-39% CK  40-59% CL  60-79% CM  80-99% CN  100%   Barthel Score 0-100 100 99-80 79-60 59-40 20-39 1-19    0   Barthel Score 0-20 20 17-19 13-16 9-12 5-8 1-4 0      The Barthel ADL Index: Guidelines  1. The index should be used as a record of what a patient does, not as a record of what a patient could do. 2. The main aim is to establish degree of independence from any help, physical or verbal, however minor and for whatever reason. 3. The need for supervision renders the patient not independent. 4. A patient's performance should be established using the best available evidence. Asking the patient, friends/relatives and nurses are the usual sources, but direct observation and common sense are also important. However direct testing is not needed.   5. Usually the patient's performance over the preceding 24-48 hours is important, but occasionally longer periods will be relevant. 6. Middle categories imply that the patient supplies over 50 per cent of the effort. 7. Use of aids to be independent is allowed. Katie Caal., Barthel, D.W. ([id number]). Functional evaluation: the Barthel Index. [address] [facility] (14)2. KIKI Shahid, Kelley Ramírez., [facility]., [facility], [address] [place] [address] (1999). Measuring the change indisability after inpatient rehabilitation; comparison of the responsiveness of the Barthel Index and Functional Lewis Measure. Journal of Neurology, Neurosurgery, and Psychiatry, 66(4), 160-721. Talai Salas, N.J.A, HOMERO Downey, & Rae Reddy M.A. (2004.) Assessment of post-stroke quality of life in cost-effectiveness studies: The usefulness of the Barthel Index and the EuroQoL-5D. Quality of Life Research, 13, 437-22            G codes: In compliance with CMSs Claims Based Outcome Reporting, the following G-code set was chosen for this patient based on their primary functional limitation being treated: The outcome measure chosen to determine the severity of the functional limitation was the Barthel Index with a score of 35/100 which was correlated with the impairment scale. · Self Care:               - CURRENT STATUS:    CL - 60%-79% impaired, limited or restricted               - GOAL STATUS:           CI - 1%-19% impaired, limited or restricted               - D/C STATUS:                       ---------------To be determined---------------           Pain:  Pain Scale 1: Numeric (0 - 10)  Pain Intensity 1: 0              Activity Tolerance:   VSS- 4 L  Please refer to the flowsheet for vital signs taken during this treatment.   After treatment:   [X] Patient left in no apparent distress sitting up in chair  [ ] Patient left in no apparent distress in bed  [X] Call bell left within reach  [X] Nursing notified  [ ] Caregiver present  [X] Bed alarm activated COMMUNICATION/EDUCATION:   The patients plan of care was discussed with: Physical Therapist and Registered Nurse. [ ] Home safety education was provided and the patient/caregiver indicated understanding. [X] Patient/family have participated as able in goal setting and plan of care. [X] Patient/family agree to work toward stated goals and plan of care. [ ] Patient understands intent and goals of therapy, but is neutral about his/her participation. [ ] Patient is unable to participate in goal setting and plan of care. This patients plan of care is appropriate for delegation to [facility].      Thank you for this referral.  Ave Black OT  Time Calculation: 22 mins

## 2017-01-05 NOTE — PROGRESS NOTES
Dr. Greg Marc in to see patient. 46 PT in to work with patient. Patient OOB and in chair. 1405 Patient back to bed at this time. 1800 Patient awake, sitting up in bed and trying to eat some jello. 1900 Bedside report to Vane Soliman RN.

## 2017-01-05 NOTE — PROGRESS NOTES
Progress Note      1/5/2017 9:20 AM  NAME: Linda Nicholas   MRN:  [id number]   Admit Diagnosis: NSTEMI (non-ST elevated myocardial infarction) [facility])      Problem List:      1. NSTEMI  2. Status post cath with rotational atherectomy and placement of 3 GURWINDER in the RCA  3. Patent diagonal and LCx/OM stents  4. Status post intubation for patient safety during cath -- now extubated on BiPAP prn and O2  5. Status post temporary pacer via RCFV -- removed 1/4/17  6. UGIB -- resolved  7. LLL aspiration/atelectasis  8. Fever  9. Severe COPD  10. Hyperlipidemia  11. CAD     Assessment/Plan:     1. Continue ASA and plavix  2. Continue atorvastatin 40mg  3. Hold metoprolol with lower BPs  4. Hold ACEi/ARB with lower BPs  5. Continue synthroid  6. Antibiotics, nebs, etc per pulmonary; appreciate assistance. Inpatient pulmonary rehab on the table. 7. Out of the ICU once respiratory status is stable  8. Stop smoking         [x]       High complexity decision making was performed in this patient at high risk for decompensation with multiple organ involvement. Subjective:     [facility] with complaints of left rib pain and SOB  Discussed with RN events overnight. Review of Systems:    Symptom Y/N Comments  Symptom Y/N Comments   Fever/Chills N   Chest Pain N    Poor Appetite N   Edema N    Cough N   Abdominal Pain N    Sputum N   Joint Pain N    SOB/FERREIRA N   Pruritis/Rash N    Nausea/vomit N   Tolerating PT/OT Y    Diarrhea N   Tolerating Diet Y    Constipation N   Other       Could NOT obtain due to:      Objective:      Physical Exam:    Last 24hrs VS reviewed since prior progress note.  Most recent are:    Visit Vitals    BP 96/52    Pulse (!) 108    Temp 99.1 °F (37.3 °C)    Resp 21    Ht 4' 11\" (1.499 m)    Wt 30 kg (66 lb 2.2 oz)    LMP  (Exact Date)    SpO2 97%    BMI 13.36 kg/m2       Intake/Output Summary (Last 24 hours) at 01/05/17 0826  Last data filed at 01/05/17 0800   Gross per 24 hour Intake          1569.96 ml   Output             1777 ml   Net          -207.04 ml        General Appearance: Well developed, well nourished, alert, no acute distress. Ears/Nose/Mouth/Throat: Hearing grossly normal.  Neck: Supple. Chest: Occasional wheeze  Cardiovascular: Regular rate (tachycardic) and rhythm, S1S2 normal, no murmur. Abdomen: Soft, non-tender, bowel sounds are active. Extremities: No edema bilaterally. Skin: Warm and dry. [x]         Post-cath site without hematoma, bruit, tenderness, or thrill. Distal pulses intact.     PMH/ reviewed - no change compared to H&P    Data Review    Telemetry: sinus tach    EKG:   []  No new EKG for review    Lab Data Personally Reviewed:    Recent Labs      01/04/17   0500  01/03/17   1139   WBC  12.9*  15.8*   HGB  11.5  12.2   HCT  34.5*  36.5   PLT  255  280     Recent Labs      01/04/17   0500  01/03/17   1139   APTT  26.6  67.5*      Recent Labs      01/04/17   0500  01/03/17   1240   NA  145  144   K  4.2  4.4   CL  114*  114*   CO2  20*  23   BUN  14  16   CREA  0.65  0.79   GLU  96  148*   CA  8.0*  7.8*   MG  2.0  2.0     Recent Labs      01/03/17   1140   CPK  155   CKNDX  11.6*   TROIQ  1.39*     No results found for: CHOL, CHOLX, CHLST, CHOLV, HDL, LDL, DLDL, LDLC, DLDLP, TGL, TGLX, TRIGL, TRIGP, CHHD, CHHDX    Recent Labs      01/03/17   1240   SGOT  51*   AP  50   TP  6.2*   ALB  3.2*   GLOB  3.0     Recent Labs      01/05/17   0632  01/04/17   1240   PH  7.44  7.35   PCO2  34*  37   PO2  70*  55*       Medications Personally Reviewed:    Current Facility-Administered Medications   Medication Dose Route Frequency    nitroglycerin (NITROBID) 2 % ointment        atorvastatin (LIPITOR) tablet 40 mg  40 mg Oral QHS    albuterol/ipratropium (DUONEB) neb solution  1 Dose Nebulization Q3H PRN    guaiFENesin (ROBITUSSIN) 100 mg/5 mL oral liquid 100 mg  100 mg Oral QID    0.9% sodium chloride infusion  100 mL/hr IntraVENous CONTINUOUS    sodium chloride (NS) flush 5-10 mL  5-10 mL IntraVENous Q8H    sodium chloride (NS) flush 5-10 mL  5-10 mL IntraVENous PRN    LORazepam (ATIVAN) tablet 1 mg  1 mg Oral BID PRN    pantoprazole (PROTONIX) 40 mg in sodium chloride 0.9 % 10 mL injection  40 mg IntraVENous Q12H    aspirin chewable tablet 81 mg  81 mg Oral DAILY    albuterol-ipratropium (DUO-NEB) 2.5 MG-0.5 MG/3 ML  3 mL Nebulization Q4H RT    acetaminophen (TYLENOL) tablet 1,000 mg  1,000 mg Oral Q8H PRN    Or    acetaminophen (OFIRMEV) infusion 1,000 mg  1,000 mg IntraVENous Q8H PRN    piperacillin-tazobactam (ZOSYN) 2.25 g in 0.9% sodium chloride (MBP/ADV) 50 mL  2.25 g IntraVENous Q6H    fluticasone (FLONASE) 50 mcg/actuation nasal spray 2 Spray  2 Spray Both Nostrils DAILY    fluticasone-vilanterol (BREO ELLIPTA) 100mcg-25mcg/puff  1 Puff Inhalation DAILY    umeclidinium (INCRUSE ELLIPTA) 62.5 mcg/actuation  1 Puff Inhalation DAILY    0.9% sodium chloride infusion  50 mL/hr IntraVENous CONTINUOUS    levothyroxine (SYNTHROID) tablet 50 mcg  50 mcg Oral EVERY TUES,THUR,SAT,SUN    levothyroxine (SYNTHROID) tablet 75 mcg  75 mcg Oral Q MON, WED & FRI    clopidogrel (PLAVIX) tablet 75 mg  75 mg Oral DAILY    metoprolol tartrate (LOPRESSOR) tablet 12.5 mg  12.5 mg Oral Q12H    mupirocin (BACTROBAN) 2 % ointment   Both Nostrils BID         Nicole Pascual MD

## 2017-01-05 NOTE — PROGRESS NOTES
Interdisciplinary team rounds were held 1/5/2017  with the following team members:Care Management, Diabetes Treatment Specialist, Nursing, Nutrition, Pharmacy, Physical Therapy, Physician and Respiratory Therapy. Plan of care discussed. Goal: adjust medications, continue to monitor and support. See MD orders and progress notes for further  interventions and desired outcomes.
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
## 2017-01-06 NOTE — PROGRESS NOTES
Problem: Self Care Deficits Care Plan (Adult)  Goal: *Acute Goals and Plan of Care (Insert Text)  Occupational Therapy Goals  Initiated 1/5/2017  1. Patient will tolerate standing ADL x 5 minutes with supervision/set-up within 7 day(s). 2. Patient will perform lower body dressing with supervision/set-up within 7 day(s). 3. Patient will perform bathing with supervision/set-up within 7 day(s). 4. Patient will perform toilet transfers with supervision/set-up within 7 day(s). 5. Patient will perform all aspects of toileting with supervision/set-up within 7 day(s). 6. Patient will utilize energy conservation techniques during functional activities with verbal cues within 7 day(s). OCCUPATIONAL THERAPY TREATMENT  Patient: Alberto Johnson (55 y.o. female)  Date: 1/6/2017  Diagnosis: NSTEMI (non-ST elevated myocardial infarction) ([facility] <principal problem not specified>       Precautions: Fall, Bed Alarm      ASSESSMENT:  Patient more agreeable to therapy this date, supervision for bed mobility, CGA for sit to stand. Pt CGA with RW and completed standing ADLs at sink with close SBA. Pt with no LOB with B hands removed from support. O2 stable via 5 L, remained >90% with activity. Pt continues to demosntrate weakness with increased risk for falls. She is functioning below her ADL baseline and will need Pulmonary Rehab vs SNF ([facility] program) at discharge. Progression toward goals:  [ ]       Improving appropriately and progressing toward goals  [X]       Improving slowly and progressing toward goals  [ ]       Not making progress toward goals and plan of care will be adjusted       PLAN:  Patient continues to benefit from skilled intervention to address the above impairments. Continue treatment per established plan of care. Discharge Recommendations:  Pulmonary Rehab vs SNF [facility])  Further Equipment Recommendations for Discharge:  tbd       SUBJECTIVE:   Patient stated I am just so weak.       OBJECTIVE DATA SUMMARY:   Cognitive/Behavioral Status:  Neurologic State: Alert  Orientation Level: Oriented X4  Cognition: Follows commands              Functional Mobility and Transfers for ADLs:  Bed Mobility:  Rolling: Supervision  Supine to Sit: Supervision  Scooting: Supervision     Transfers:  Sit to Stand: Contact guard assistance  Stand to Sit: Contact guard assistance  Bed to Chair: Contact guard assistance     Balance:  Sitting: Intact  Standing: Impaired; With support  Standing - Static: Good;Constant support  Standing - Dynamic : Fair     ADL Intervention:     Pt completed standing ADLs at sink x 2 minutes with good balance with B hands removed from support to wash hands                                               Pain:  Pain Scale 1: Numeric (0 - 10)  Pain Intensity 1: 0              Activity Tolerance:   VSS via 5 L  Please refer to the flowsheet for vital signs taken during this treatment.   After treatment:   [X] Patient left in no apparent distress sitting up in chair  [ ] Patient left in no apparent distress in bed  [X] Call bell left within reach  [X] Nursing notified  [ ] Caregiver present  [X] Bed alarm activated      COMMUNICATION/COLLABORATION:   The patients plan of care was discussed with: Physical Therapist and Registered Nurse     Migdalia Moreland OT  Time Calculation: 15 mins

## 2017-01-06 NOTE — PROGRESS NOTES
1600: Bedside shift change report given to Lawrence Ling (oncoming nurse) by Nicholas Freedman. [facility] RN (offgoing nurse). Report included the following information SBAR, Kardex, Intake/Output, MAR, Recent Results and Cardiac Rhythm NSR. Assisted back to bed. 1830: pt complaining of PIV burning, PIV flushed and had blood return. Pt wanted PIV removed. New PIV placed in right forearm and old PIV removed.

## 2017-01-06 NOTE — PROGRESS NOTES
[facility] Consult Service Progress NOTE  Pulmonary, Critical Care, and Sleep Medicine    Name: Wynette Babinski MRN: [id number]   : 1945 Hospital: [facility]   Date: 2017  Admission Date: 1/3/2017     Chart and notes reviewed. Data reviewed. Pt seen on rounds earlier today. I have evaluated and examined the patient. Pt is acutely ill. Reviewed the evaluation and will assist in implementing the plan as outlined by Dr. Saqib Christine and team    Wynette Babinski is a 66 y.o. female with PMhx significant for stents, MI (), and COPD who presented 1/3/17 via EMS [facility] [address] [facility] with cc of chest tightness and chest pressure since 400. Pt sx radiated into her left arm. She described this a tingling sensation. She took Aspirin with no relief. She noted being anxious and under a lot of stress during the holidays, stating she is unsure if this is related to her sx. She reports additional sx of a productive cough and SOB, noting this at baseline due to her COPD. She takes Cardizem and baby Aspirin daily. She has a history of heart disease, and she has been treated at [facility]. Pt seen by Dr. Nicole Weston and taken emergently aleksander the cath lab for NSTEMI. She was intubated for the procedure. Cath showed patent Diagonal and LCX/OM stents. There was a discrete 99% focal stenosis in the mid RCA- calcified with a distal stenosis at the bifurcation. Rotational atherectomy after 6Fr upsize and RCFV temporary pacemaker insertion performed and GURWINDER placed in the RCA/PDA. Pt now in the CCU with a temporary pacemaker on the vent and sedated. She is restless when lightened. Early AM pt had copious coffee ground NG output. Pt is a chronic smoker, 1-2 packs a day and has a longstanding history of severe COPD. She is quite short of breath from this.  She uses a number of inhalers for this    SOCIAL HISTORY: She [place]. 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 Smokes 1-2 packs per day for 50 years. FAMILY HISTORY: Negative for heart disease  PCP: Nery Pope MD      Hospital Day: 4  1/6 less SOb. Still congested; had a recent dental infection right lower jaw but could not get to dentist;  that is no longer painful. Discussed Pulmonary rehab or SNF; pt would prefer local facility    1/5 extubated on 1/4; severe FERREIRA. Has been independent living alone [place] the woods in [address]. Son lives in [facility]; apprehensive about moving. No loca l support. Does own shopping. Has nebs at home. IMPRESSION:   · Respiratory infficiency extubated  · Severe chronic obstructive pulmonary disease. apparently not on home O2 but on multiple inhalers  · Acute non-ST elevation myocardial infarction  · Severe hypophosphatemia with hypokalemia  · Fever  · LLL atelectasis vs aspiration penumonia on empiric abx   · UGIB likely from stress gastritis- initiall Hgb suggests some chronic hypoxia at home  · Acute unstable angina pectoris, class 4.   · Coronary artery atherosclerosis. · Status post remote myocardial infarction in 2010. · Status post remote percutaneous coronary intervention with unknown coronary artery in [facility] in 2010. · Hyperlipidemia.       RECOMMENDATIONS/PLAN:   · May transfer to telemetry  · Mobilize  · If she can rally would suggest inpt Pulmonary rehab or SNF  · BIPAP prn  · Empiric abx for possible aspiration  · Pulmonary toilet  · Pacemaker per Cardiology  · PPI  · Will need to assess O2 needs prior to discharge  · Smoking cessation counseling  · DVT prophylaxis when able  · Will be available to assist in medical management while in the CCU pending disposition     Code: Full Code          Vital Signs: Intake/Output: Intake/Output:   Visit Vitals    /51    Pulse 98    Temp 98.8 °F (37.1 °C)    Resp 27    Ht 4' 11\" (1.499 m)    Wt 30 kg (66 lb 2.2 oz)    LMP  (Exact Date)    SpO2 96%    BMI 13.36 kg/m2      O2 Device: Nasal cannula  O2 Flow Rate (L/min): 3.5 l/min (Found pt on 3. 5LPM O2; no resp distress noted.)  Temp (24hrs), Av.2 °F (37.3 °C), Min:98.8 °F (37.1 °C), Max:99.4 °F (37.4 °C)     Intake/Output Summary (Last 24 hours) at 17 08  Last data filed at 17 5095   Gross per 24 hour   Intake              575 ml   Output             1129 ml   Net             -554 ml    Last shift:         Last 3 shifts: 1 -  0700  In: 5830 [P.O.:325; I.V.:1000]  Out: 2389 [Urine:2389]         Telemetry:    normal sinus rhythm    Physical Exam:    General: petite WF SOB; non-toxic, ill and cachectic/lean   HEENT: dry; no thrush   Neck: No abnormally enlarged lymph nodes.    Chest: petite, nomi   Lungs: decreased air exchange bilaterally; congested cough   Heart: Regular rate and rhythm or S1S2 present   Abdomen: soft, non-tender, without masses or organomegaly   : Box   Extremity: negative, cyanosis, clubbing;    Neuro: alert   Psych: calmer and fair insight   Skin: Skin unremarkable;   Pulses: Bilateral, Radial, 2+ Normal upper and lower extremity pulses   Capillary refill: normal well perfused;      DATA:    MAR reviewed and pertinent medications noted or modified as needed    MEDS:   Current Facility-Administered Medications   Medication    atorvastatin (LIPITOR) tablet 40 mg    guaiFENesin (ROBITUSSIN) 100 mg/5 mL oral liquid 100 mg    albuterol-ipratropium (DUO-NEB) 2.5 MG-0.5 MG/3 ML    pantoprazole (PROTONIX) tablet 40 mg    sodium chloride (NS) flush 5-10 mL    sodium chloride (NS) flush 5-10 mL    LORazepam (ATIVAN) tablet 1 mg    aspirin chewable tablet 81 mg    albuterol-ipratropium (DUO-NEB) 2.5 MG-0.5 MG/3 ML    piperacillin-tazobactam (ZOSYN) 2.25 g in 0.9% sodium chloride (MBP/ADV) 50 mL    fluticasone (FLONASE) 50 mcg/actuation nasal spray 2 Spray    fluticasone-vilanterol (BREO ELLIPTA) 100mcg-25mcg/puff    umeclidinium (INCRUSE ELLIPTA) 62.5 mcg/actuation    levothyroxine (SYNTHROID) tablet 50 mcg    levothyroxine (SYNTHROID) tablet 75 mcg    clopidogrel (PLAVIX) tablet 75 mg    metoprolol tartrate (LOPRESSOR) tablet 12.5 mg    mupirocin (BACTROBAN) 2 % ointment          Labs:  Recent Labs      01/06/17   0353  01/04/17   0500  01/03/17   1139   WBC  12.8*  12.9*  15.8*   HGB  9.3*  11.5  12.2   PLT  179  255  280   APTT  29.8  26.6  67.5*     Recent Labs      01/06/17   0353  01/04/17   0500  01/03/17   1240   NA  143  145  144   K  3.0*  4.2  4.4   CL  107  114*  114*   CO2  26  20*  23   GLU  93  96  148*   BUN  17  14  16   CREA  0.65  0.65  0.79   CA  7.8*  8.0*  7.8*   MG  2.1  2.0  2.0   PHOS  1.1*   --    --    ALB  2.5*   --   3.2*   SGOT  56*   --   51*   ALT  31   --   40     Recent Labs      01/06/17   0536  01/05/17   0632  01/04/17   1240  01/04/17   0535   PH  7.45  7.44  7.35  7.31*   PCO2  39  34*  37  41   PO2  105*  70*  55*  78*   HCO3  27*  22  20*  20*   FIO2   --   4  40  40     Recent Labs      01/06/17   0536  01/05/17   0632  01/04/17   1240  01/04/17   0535   PH  7.45  7.44  7.35  7.31*   PCO2  39  34*  37  41   PO2  105*  70*  55*  78*   HCO3  27*  22  20*  20*   FIO2   --   4  40  40     Recent Labs      01/03/17   1140   CPK  155   CKNDX  11.6*   TROIQ  1.39*       Imaging:  []                           I have personally reviewed the patients radiographs and reports:        Results from Hospital Encounter encounter on 01/03/17   XR CHEST PORT   Narrative INDICATION:  Left lower lobe pneumonia    COMPARISON: 1/5/2017. FINDINGS: An AP radiograph of the chest demonstrates persistent left lower lobe  airspace disease. Lungs otherwise clear. .  The cardiac and mediastinal contours  and pulmonary vascularity are normal.  The bones and soft tissues are within  normal limits.           Impression IMPRESSION: Persistent left lower lobe airspace disease. No results found for this or any previous visit.     Taty Metz MD

## 2017-01-06 NOTE — PROGRESS NOTES
Progress Note      1/6/2017 9:20 AM  NAME: Luly Mcghee   MRN:  [id number]   Admit Diagnosis: NSTEMI (non-ST elevated myocardial infarction) [facility])      Problem List:      1. NSTEMI  2. Status post cath with rotational atherectomy and placement of 3 GURWINDER in the RCA  3. CAD s/p prior RCA stenting  4. Status post intubation for patient safety during cath -- now extubated on BiPAP prn and O2  5. Status post temporary pacer via RCFV -- removed 1/4/17  6. UGIB -- resolved  7. LLL aspiration/atelectasis  8. Fever  9. Severe COPD  10. Hyperlipidemia  11. Hypokalemia  12. Hypophosphatemia     Assessment/Plan:     1. Continue ASA and plavix  2. Continue atorvastatin 40mg  3. Continue metoprolol 12.5mg q12; she is tolerating this fine (breathing better)  4. If BPs hold; would like to start ACEi this weekend  5. Continue synthroid  6. Antibiotics, nebs, etc per pulmonary; appreciate assistance. Inpatient pulmonary rehab vs SNF -- limiting factor now is COPD and electrolytes  7. Transfer to [facility] when a bed is available. Will likely need to be here through the weekend; will plan for d/c early week  8. Stop smoking         [x]       High complexity decision making was performed in this patient at high risk for decompensation with multiple organ involvement. Subjective:     Luly Mcghee with is better; breathing improved. No CP  Discussed with RN events overnight. Review of Systems:    Symptom Y/N Comments  Symptom Y/N Comments   Fever/Chills N   Chest Pain N    Poor Appetite N   Edema N    Cough N   Abdominal Pain N    Sputum N   Joint Pain N    SOB/FERREIRA N   Pruritis/Rash N    Nausea/vomit N   Tolerating PT/OT Y    Diarrhea N   Tolerating Diet Y    Constipation N   Other       Could NOT obtain due to:      Objective:      Physical Exam:    Last 24hrs VS reviewed since prior progress note.  Most recent are:    Visit Vitals    /64    Pulse 91    Temp 99.2 °F (37.3 °C)    Resp 21    Ht 4' 11\" (1.499 m)    Wt 30 kg (66 lb 2.2 oz)    LMP  (Exact Date)    SpO2 99%    BMI 13.36 kg/m2       Intake/Output Summary (Last 24 hours) at 01/06/17 0854  Last data filed at 01/06/17 0800   Gross per 24 hour   Intake              575 ml   Output             1164 ml   Net             -589 ml        General Appearance: Well developed, well nourished, alert, no acute distress. Ears/Nose/Mouth/Throat: Hearing grossly normal.  Neck: Supple. Chest: Occasional wheeze  Cardiovascular: Regular rate (tachycardic) and rhythm, S1S2 normal, no murmur. Abdomen: Soft, non-tender, bowel sounds are active. Extremities: No edema bilaterally. Skin: Warm and dry. [x]         Post-cath site without hematoma, bruit, tenderness, or thrill. Distal pulses intact.     PMH/SH reviewed - no change compared to H&P    Data Review    Telemetry: sinus tach    EKG:   []  No new EKG for review    Lab Data Personally Reviewed:    Recent Labs      01/06/17   0353  01/04/17   0500   WBC  12.8*  12.9*   HGB  9.3*  11.5   HCT  27.7*  34.5*   PLT  179  255     Recent Labs      01/06/17   0353  01/04/17   0500  01/03/17   1139   APTT  29.8  26.6  67.5*      Recent Labs      01/06/17   0353  01/04/17   0500  01/03/17   1240   NA  143  145  144   K  3.0*  4.2  4.4   CL  107  114*  114*   CO2  26  20*  23   BUN  17  14  16   CREA  0.65  0.65  0.79   GLU  93  96  148*   CA  7.8*  8.0*  7.8*   MG  2.1  2.0  2.0     Recent Labs      01/03/17   1140   CPK  155   CKNDX  11.6*   TROIQ  1.39*     No results found for: CHOL, CHOLX, CHLST, CHOLV, HDL, LDL, DLDL, LDLC, DLDLP, TGL, Merril Tasneem, CHHD, CHHDX    Recent Labs      01/06/17   0353  01/03/17   1240   SGOT  56*  51*   AP  72  50   TP  5.8*  6.2*   ALB  2.5*  3.2*   GLOB  3.3  3.0     Recent Labs      01/06/17   0536  01/05/17   0632   PH  7.45  7.44   PCO2  39  34*   PO2  105*  70*       Medications Personally Reviewed:    Current Facility-Administered Medications   Medication Dose Route Frequency    potassium phosphate 15 mmol in 0.9% sodium chloride 250 mL infusion   IntraVENous BID    [START ON 1/7/2017] phosphorus (K PHOS NEUTRAL) 250 mg tablet 1 Tab  1 Tab Oral BID    atorvastatin (LIPITOR) tablet 40 mg  40 mg Oral QHS    guaiFENesin (ROBITUSSIN) 100 mg/5 mL oral liquid 100 mg  100 mg Oral QID    albuterol-ipratropium (DUO-NEB) 2.5 MG-0.5 MG/3 ML  3 mL Nebulization Q3H PRN    pantoprazole (PROTONIX) tablet 40 mg  40 mg Oral ACB    sodium chloride (NS) flush 5-10 mL  5-10 mL IntraVENous Q8H    sodium chloride (NS) flush 5-10 mL  5-10 mL IntraVENous PRN    LORazepam (ATIVAN) tablet 1 mg  1 mg Oral BID PRN    aspirin chewable tablet 81 mg  81 mg Oral DAILY    albuterol-ipratropium (DUO-NEB) 2.5 MG-0.5 MG/3 ML  3 mL Nebulization Q4H RT    piperacillin-tazobactam (ZOSYN) 2.25 g in 0.9% sodium chloride (MBP/ADV) 50 mL  2.25 g IntraVENous Q6H    fluticasone (FLONASE) 50 mcg/actuation nasal spray 2 Spray  2 Spray Both Nostrils DAILY    fluticasone-vilanterol (BREO ELLIPTA) 100mcg-25mcg/puff  1 Puff Inhalation DAILY    umeclidinium (INCRUSE ELLIPTA) 62.5 mcg/actuation  1 Puff Inhalation DAILY    levothyroxine (SYNTHROID) tablet 50 mcg  50 mcg Oral EVERY TUES,THUR,SAT,SUN    levothyroxine (SYNTHROID) tablet 75 mcg  75 mcg Oral Q MON, WED & FRI    clopidogrel (PLAVIX) tablet 75 mg  75 mg Oral DAILY    metoprolol tartrate (LOPRESSOR) tablet 12.5 mg  12.5 mg Oral Q12H    mupirocin (BACTROBAN) 2 % ointment   Both Nostrils BID         Jael Albarado MD

## 2017-01-06 NOTE — CARDIO/PULMONARY
C/P Rehab. Note:    Chart Reviewed:     Chart Reviewed. Admitting diagnosis of NSTEMI S/p PCI with stents. Status post temporary pacer via RCFV -- removed 1/4/17. Pt started on plavix     Pt with severe COPD      PMH significant for:  1. MI in 2010 with stent  2. Bipolar Dz  3. Hyperlipidemia     Pt is a current day smoker. Met with patient today. She is sitting up in chair with oxygen in place watching television. Printed material given and discussed cardiac rehab and gave her our information and contact number. I explained to her that she would qualify for outpatient rehab. She lives closer to [place], so number given to her for [facility] in [place] which also has a program.     I asked her to review packet I gave her on  heart healthy habits,   what to expect following coronary angioplasty, and post cardiac catheterization instructions and told her we would be following up with her during her stay to answer any questions she may have.

## 2017-01-06 NOTE — INTERDISCIPLINARY ROUNDS
Interdisciplinary team rounds were held 1/6/2017 with the following team members:Care Management, Diabetes Treatment Specialist, Nursing, Nutrition, Pharmacy, Physical Therapy, Physician and Respiratory Therapy. Plan of care discussed. See clinical pathway and/or care plan for interventions and desired outcomes.

## 2017-01-06 NOTE — PROGRESS NOTES
1900:  Bedside and Verbal shift change report given to [address] (oncoming nurse) by Ty Torres RN (offgoing nurse). Report included the following information SBAR, Kardex, Intake/Output, MAR, Recent Results, Med Rec Status and Cardiac Rhythm Sinus Tach. 2000:  Assessment completed, VSS, 4Ls nasal canula, Sinus tach, afebrile. Patient alert, orientedx4, follows commands. Patient exhibiting anxiety related to decreased independence upon discharge, increased need for supplemental home O2 and inability to heat her home with her wood stove. Lungs diminished, tachypneic. Bowel sounds active, passing flatus. Patient voiding via lindsay catheter clear yellow urine. Skin intact, right groin cath site with ecchymosis, no bleeding, no hematoma. Call bell with in reach, will continue to monitor. 0000: Reassessment completed, no changes noted. Lemuel continue to monitor. 0400:  Reassessment completed, no changes noted. Will continue to monitor.

## 2017-01-06 NOTE — PROGRESS NOTES
Problem: Mobility Impaired (Adult and Pediatric)  Goal: *Acute Goals and Plan of Care (Insert Text)  Physical Therapy Goals  Initiated 1/5/2017  1. Patient will move from supine to sit and sit to supine , scoot up and down and roll side to side in bed with supervision/set-up within 7 day(s). 2. Patient will transfer from bed to chair and chair to bed with supervision/set-up using the least restrictive device within 7 day(s). 3. Patient will perform sit to stand with supervision/set-up within 7 day(s). 4. Patient will ambulate with supervision/set-up for 75 feet with the least restrictive device within 7 day(s). PHYSICAL THERAPY TREATMENT  Patient: Lianne Pantoja (60 y.o. female)  Date: 1/6/2017  Diagnosis: NSTEMI (non-ST elevated myocardial infarction) ([facility] [address] <principal problem not specified>       Precautions: Fall, Bed Alarm      ASSESSMENT:  Pt continues to make slow, steady gains towards therapy goals and overall functional mobility, demonstrating improved gait stability with use of RW during ambulation trial this date. Pt able to progress ambulation trial this date to 20ft w/ RW and CGA before fatigue. Gait speed remains significantly decreased to non-functional pace with shuffling pattern overall. All mobility occurred on 5 LPM O2 via NC with O2 sats 87% at completion of gait, HR elevated to 110s. Recommend continued use of RW during all OOB mobility/ambulation at this time. Continue to recommend rehab at discharge, pulmonary rehab vs SNF [facility] program). Progression toward goals:  [ ]    Improving appropriately and progressing toward goals  [X]    Improving slowly and progressing toward goals  [ ]    Not making progress toward goals and plan of care will be adjusted       PLAN:  Patient continues to benefit from skilled intervention to address the above impairments. Continue treatment per established plan of care.   Discharge Recommendations:  Pulmonary Rehab vs SNF [facility] program)  Further Equipment Recommendations for Discharge:  TBD by facility       SUBJECTIVE:   Patient stated Oh, I am so weak. Miguelina Webster      OBJECTIVE DATA SUMMARY:   Critical Behavior:  Neurologic State: Alert  Orientation Level: Appropriate for age  Cognition: Follows commands     Functional Mobility Training:  Bed Mobility:  Rolling: Supervision  Supine to Sit: Supervision     Scooting: Supervision        Transfers:  Sit to Stand: Contact guard assistance  Stand to Sit: Contact guard assistance        Bed to Chair: Contact guard assistance                    Balance:  Sitting: Intact  Standing: Impaired; With support  Standing - Static: Good;Constant support  Standing - Dynamic : Fair  Ambulation/Gait Training:  Distance (ft): 20 Feet (ft)  Assistive Device: Gait belt;Walker, rolling  Ambulation - Level of Assistance: Contact guard assistance        Gait Abnormalities: Decreased step clearance;Shuffling gait        Base of Support: Narrowed     Speed/Brittany: Shuffled  Step Length: Left shortened;Right shortened                             Pt ambulated 10ft x2 (standing rest break b/t) w/ RW and CGA, exhibiting slow, shuffling gait. Gait speed decreased to non-functional speed. Pain:  Pain Scale 1: Numeric (0 - 10)  Pain Intensity 1: 0              Activity Tolerance:   O2 sats decreased to 87% with HR elevated to 110s during ambulation on 4.5L O2 via NC   Please refer to the flowsheet for vital signs taken during this treatment.   After treatment:   [X]    Patient left in no apparent distress sitting up in chair  [ ]    Patient left in no apparent distress in bed  [X]    Call bell left within reach  [X]    Nursing notified  [ ]    Caregiver present  [X]    Bed alarm activated      COMMUNICATION/COLLABORATION:   The patients plan of care was discussed with: Occupational Therapist and Registered Nurse     Jacquelyn Tellez PT, DPT   Time Calculation: 16 mins

## 2017-01-06 NOTE — PROGRESS NOTES
0700: Bedside and Verbal shift change report given to Stew Alves RN  (oncoming nurse) by [address] RN  (offgoing nurse). Report included the following information SBAR, Kardex, ED Summary, OR Summary, Procedure Summary, Intake/Output, MAR, Accordion, Recent Results and Med Rec Status.

## 2017-01-07 NOTE — PROGRESS NOTES
Cardiology Progress Note  1/7/2017    Admit Date: 1/3/2017  Admit Diagnosis: NSTEMI (non-ST elevated myocardial infarction) ([facility] [address]  CC:  None currently       Problem List (as per Dr Gracy Myrick):       1. NSTEMI  2. Status post cath with rotational atherectomy and placement of 3 GURWINDER in the RCA  3. CAD s/p prior RCA stenting  4. Status post intubation for patient safety during cath -- now extubated on BiPAP prn and O2  5. Status post temporary pacer via RCFV -- removed 1/4/17  6. UGIB -- resolved  7. LLL aspiration/atelectasis  8. Fever  9. Severe COPD  10. Hyperlipidemia  11. Hypokalemia  12. Hypophosphatemia      Assessment/Plan (as per Dr Gracy Myrick):      1. Continue ASA and plavix  2. Continue atorvastatin 40mg  3. Continue metoprolol 12.5mg q12; she is tolerating this fine (breathing better)  4. If BPs hold; would like to start ACEi this weekend  5. Continue synthroid  6. Antibiotics, nebs, etc per pulmonary; appreciate assistance. Inpatient pulmonary rehab vs SNF -- limiting factor now is COPD and electrolytes  7. Transfer to tele when a bed is available. Will likely need to be here through the weekend; will plan for d/c early week  8. Stop smoking     1/7: Stable dyspnea; No CP. To tele; add low dose arb. Cont k/phos Rx. For other plans, see orders.   High complexity decision making was performed  X Yes   High-risk of decompensation with multiple organ involvement X Yes   Hospital problem list   Active Hospital Problems    Diagnosis Date Noted    NSTEMI (non-ST elevated myocardial infarction) ([facility] [address] 01/03/2017                                                         Subjective:  Patient reports  []   nothing; unable to communicate    []   intubated   Chest pain X none  consistent with:  Non-cardiac CP         Atypical CP     None now  On going  Anginal CP     Dyspnea  none  at rest  with exertion        x improved  unchanged  worse              PND X none  overnight       Orthopnea X none  improved  unchanged  worse   Presyncope X none  improved  unchanged  worse   Ambulated in hallway without symptoms   Yes   Ambulated in room without symptoms  Yes     ROS Hematuria:  Yes X No Dysuria:  Yes X No                (2+  other systems) Cough: Yes X No Sputum: Yes X No                 BRBPR:  Yes X No Melena: Yes X No   No change in family and social history from H&P/Consult note.   Objective:    Physical Exam:  24 hr VS reviewed, overall VSSAF  Temp (24hrs), Av.1 °F (37.3 °C), Min:98.8 °F (37.1 °C), Max:99.5 °F (37.5 °C)    Patient Vitals for the past 8 hrs:   Pulse   17 0900 82   17 0800 90   17 0700 85   17 0600 85   17 0500 90   17 0400 92   17 0300 89    Patient Vitals for the past 8 hrs:   Resp   17 0900 22   17 0800 27   17 0700 22   17 0600 22   17 0500 21   17 0400 22   17 0300 23    Patient Vitals for the past 8 hrs:   BP   17 0900 114/66   17 0800 122/66   17 0700 122/68   17 0625 112/52   17 0600 112/53   17 0500 123/61   17 0400 109/64   17 0300 121/70        Intake/Output Summary (Last 24 hours) at 17 1007  Last data filed at 17 0932   Gross per 24 hour   Intake              210 ml   Output             1070 ml   Net             -860 ml     General: x WD,WN  Elderly  Cachetic x NAD     Agitated  Lethargic  Arousable  Obtunded     Sedated  On Bipap  Intubated                ENT/Palate: X WNL  Dry MM  anicteric                Respiratory:  CTA  Nl resp effort  Increased effort  No significant change    x Rhonchi left base  rales  improved  worse              Cardiovasc: X RRR  IRRR X Nl S1, S2 x No rub     No murmur X No new murmur  Murmur c/w: x No gallop    x No edema  BLE edema:+  RLE edema:+  LLE edema:+     Edema less  Edema more  Edema same  Edema worse    X Nl JVP  Elevated JVP  JVP same  JVP worse    X Carotid wnl x abd aorta not palpated X no peripheral emboli noted                GI: X abd soft x nondistended X BS present X No organo- megaly noted              Skin: X Warm, dry  Cold extremites                  Neuro: x A/O x Grossly non- focal  Obtunded  Sedated     Lethargic  Arousable  intubated       cath site intact w/o hematoma or new bruit; distal pulse unchanged  Yes   Data Review:     Telemetry independently reviewed x sinus  chronic afib  parox afib  NSVT     ECG independently reviewed  NSR  afib  no significant changes  NSST-Tw chgs   x no new ECG provided for review   Lab results reviewed as noted below. Current medications reviewed as noted below. Recent Labs      01/06/17   0536  01/05/17   0632   PH  7.45  7.44   PCO2  39  34*   PO2  105*  70*     No results for input(s): CPK, CKMB in the last 72 hours. No lab exists for component: TROPONINI  Recent Labs      01/07/17   0429  01/06/17   0353   NA  142  143   K  3.4*  3.0*   CL  108  107   CO2  24  26   BUN  15  17   CREA  0.56  0.65   GLU  71  93   PHOS  2.1*  1.1*   CA  7.8*  7.8*   ALB   --   2.5*   WBC   --   12.8*   HGB   --   9.3*   HCT   --   27.7*   PLT   --   179     Recent Labs      01/06/17   0353   SGOT  56*   AP  72   TBILI  0.6   TP  5.8*   ALB  2.5*   GLOB  3.3     Recent Labs      01/06/17   0353   APTT  29.8      No results for input(s): FE, TIBC, PSAT, FERR in the last 72 hours.    Lab Results   Component Value Date/Time    Glucose (POC) 75 01/07/2017 06:20 AM    Glucose (POC) 134 01/05/2017 11:51 AM       Current Facility-Administered Medications   Medication Dose Route Frequency    phosphorus (K PHOS NEUTRAL) 250 mg tablet 1 Tab  1 Tab Oral BID    atorvastatin (LIPITOR) tablet 40 mg  40 mg Oral QHS    guaiFENesin (ROBITUSSIN) 100 mg/5 mL oral liquid 100 mg  100 mg Oral QID    albuterol-ipratropium (DUO-NEB) 2.5 MG-0.5 MG/3 ML  3 mL Nebulization Q3H PRN    pantoprazole (PROTONIX) tablet 40 mg  40 mg Oral ACB    sodium chloride (NS) flush 5-10 mL 5-10 mL IntraVENous Q8H    sodium chloride (NS) flush 5-10 mL  5-10 mL IntraVENous PRN    LORazepam (ATIVAN) tablet 1 mg  1 mg Oral BID PRN    aspirin chewable tablet 81 mg  81 mg Oral DAILY    albuterol-ipratropium (DUO-NEB) 2.5 MG-0.5 MG/3 ML  3 mL Nebulization Q4H RT    piperacillin-tazobactam (ZOSYN) 2.25 g in 0.9% sodium chloride (MBP/ADV) 50 mL  2.25 g IntraVENous Q6H    fluticasone (FLONASE) 50 mcg/actuation nasal spray 2 Spray  2 Spray Both Nostrils DAILY    fluticasone-vilanterol (BREO ELLIPTA) 100mcg-25mcg/puff  1 Puff Inhalation DAILY    umeclidinium (INCRUSE ELLIPTA) 62.5 mcg/actuation  1 Puff Inhalation DAILY    levothyroxine (SYNTHROID) tablet 50 mcg  50 mcg Oral EVERY TUES,THUR,SAT,SUN    levothyroxine (SYNTHROID) tablet 75 mcg  75 mcg Oral Q MON, WED & FRI    clopidogrel (PLAVIX) tablet 75 mg  75 mg Oral DAILY    metoprolol tartrate (LOPRESSOR) tablet 12.5 mg  12.5 mg Oral Q12H    mupirocin (BACTROBAN) 2 % ointment   Both Nostrils BID         Deacon Flores MD

## 2017-01-07 NOTE — PROGRESS NOTES
1910- report received from [place], [facility] ans assumed care of pt who is resting with eyes closed and VSS.

## 2017-01-07 NOTE — PROGRESS NOTES
0000: Received report from [place]. Patient resting comfortably with family at bedside. Assessment complete. Patent is alert and oriented. Complaining of some abdominal pain described as cramping, placed on bedpan. Lungs are coarse and patient has a strong productive cough. Box in place draining clear yellow urine. Groin site ecchymotic with dressing in place. Pedal pulses palpable. Admission data complete. 0400: Reassessment complete. No changes. Resting comfortably. 0600: Patient complaining of being SOB sats 95%. Requesting treatment. RT called. 0720: Bedside and Verbal shift change report given to [facility] (oncoming nurse) by Caitlin Hays  (offgoing nurse). Report included the following information SBAR, Kardex, ED Summary and Recent Results.

## 2017-01-07 NOTE — PROGRESS NOTES
[facility] Consult Service Progress NOTE  Pulmonary, Critical Care, and Sleep Medicine    Name: Milena Rosas MRN: [id number]   : 1945 Hospital: [facility]   Date: 2017  Admission Date: 1/3/2017       SOCIAL HISTORY: She [place] own [place]. 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 Smokes 1-2 packs per day for 50 years. FAMILY HISTORY: Negative for heart disease  PCP: Juliet Dugan MD      Hospital Day: 17: No acute complaints. Still feels weak, has a congested cough. No issues last night.  less SOb. Still congested; had a recent dental infection right lower jaw but could not get to dentist;  that is no longer painful. Discussed Pulmonary rehab or SNF; pt would prefer local facility     extubated on ; severe FERREIRA. Has been independent living alone [place] the [place] in [address]. Son lives in [facility]; apprehensive about moving. No loca l support. Does own shopping. Has nebs at home. IMPRESSION:   · Respiratory infficiency extubated, weaned to [place] oxygen. · Severe chronic obstructive pulmonary disease. apparently not on home O2 but on multiple inhalers  · Acute non-ST elevation myocardial infarction  · Severe hypophosphatemia with hypokalemia  · Fever  · LLL atelectasis vs aspiration penumonia on empiric abx   · UGIB likely from stress gastritis- initiall Hgb suggests some chronic hypoxia at home  · Acute unstable angina pectoris, class 4.   · Coronary artery atherosclerosis. · Status post remote myocardial infarction in . · Status post remote percutaneous coronary intervention with unknown coronary artery in [facility] in . · Hyperlipidemia. RECOMMENDATIONS/PLAN:   · May transfer to telemetry, day 2 waiting for bed.    · Mobilize  · If she can rally would suggest inpt Pulmonary rehab or SNF  · Empiric abx for possible aspiration  · Pulmonary toilet  · Pacemaker per Cardiology  · PPI  · Will need to assess O2 needs prior to discharge  · Smoking cessation counseling  · DVT prophylaxis when able  · Will be available to assist in medical management while in the CCU pending disposition  · Will see again on Monday. Call if any issues. Code: Full Code          Vital Signs: Intake/Output: Intake/Output:   Visit Vitals    /66    Pulse 90    Temp 99.2 °F (37.3 °C)    Resp 27    Ht 4' 11\" (1.499 m)    Wt 30 kg (66 lb 2.2 oz)    LMP  (Exact Date)    SpO2 95%    BMI 13.36 kg/m2      O2 Device: Nasal cannula  O2 Flow Rate (L/min): 4.5 l/min  Temp (24hrs), Av.2 °F (37.3 °C), Min:98.8 °F (37.1 °C), Max:99.5 °F (37.5 °C)     Intake/Output Summary (Last 24 hours) at 17 0805  Last data filed at 17 0600   Gross per 24 hour   Intake              160 ml   Output             1010 ml   Net             -850 ml    Last shift:         Last 3 shifts:  1901 -  0700  In: 260 [I.V.:260]  Out: 1520 [Urine:1520]         Telemetry:    normal sinus rhythm    Physical Exam:    General: petite WF SOB; non-toxic, ill and cachectic/lean   HEENT: dry; no thrush   Neck: No abnormally enlarged lymph nodes. Chest: petite, nomi   Lungs: decreased air exchange bilaterally; has some bilateral rhonchi.     Heart: Regular rate and rhythm or S1S2 present   Abdomen: soft, non-tender, without masses or organomegaly   : Box   Extremity: negative, cyanosis, clubbing;    Neuro: alert   Psych: calmer and fair insight   Skin: Skin unremarkable;   Pulses: Bilateral, Radial, 2+ Normal upper and lower extremity pulses   Capillary refill: normal well perfused;      DATA:    MAR reviewed and pertinent medications noted or modified as needed    MEDS:   Current Facility-Administered Medications   Medication    potassium phosphate 20 mmol in 0.9% sodium chloride 500 mL infusion    phosphorus (K PHOS NEUTRAL) 250 mg tablet 1 Tab    atorvastatin (LIPITOR) tablet 40 mg    guaiFENesin (ROBITUSSIN) 100 mg/5 mL oral liquid 100 mg    albuterol-ipratropium (DUO-NEB) 2.5 MG-0.5 MG/3 ML    pantoprazole (PROTONIX) tablet 40 mg    sodium chloride (NS) flush 5-10 mL    sodium chloride (NS) flush 5-10 mL    LORazepam (ATIVAN) tablet 1 mg    aspirin chewable tablet 81 mg    albuterol-ipratropium (DUO-NEB) 2.5 MG-0.5 MG/3 ML    piperacillin-tazobactam (ZOSYN) 2.25 g in 0.9% sodium chloride (MBP/ADV) 50 mL    fluticasone (FLONASE) 50 mcg/actuation nasal spray 2 Spray    fluticasone-vilanterol (BREO ELLIPTA) 100mcg-25mcg/puff    umeclidinium (INCRUSE ELLIPTA) 62.5 mcg/actuation    levothyroxine (SYNTHROID) tablet 50 mcg    levothyroxine (SYNTHROID) tablet 75 mcg    clopidogrel (PLAVIX) tablet 75 mg    metoprolol tartrate (LOPRESSOR) tablet 12.5 mg    mupirocin (BACTROBAN) 2 % ointment          Labs:  Recent Labs      01/06/17   0353   WBC  12.8*   HGB  9.3*   PLT  179   APTT  29.8     Recent Labs      01/07/17   0429  01/06/17   0353   NA  142  143   K  3.4*  3.0*   CL  108  107   CO2  24  26   GLU  71  93   BUN  15  17   CREA  0.56  0.65   CA  7.8*  7.8*   MG  2.3  2.1   PHOS  2.1*  1.1*   ALB   --   2.5*   SGOT   --   56*   ALT   --   31     Recent Labs      01/06/17   0536  01/05/17   0632  01/04/17   1240   PH  7.45  7.44  7.35   PCO2  39  34*  37   PO2  105*  70*  55*   HCO3  27*  22  20*   FIO2   --   4  40     Recent Labs      01/06/17   0536  01/05/17   0632  01/04/17   1240   PH  7.45  7.44  7.35   PCO2  39  34*  37   PO2  105*  70*  55*   HCO3  27*  22  20*   FIO2   --   4  40     No results for input(s): CPK, CKNDX, TROIQ in the last 72 hours.     No lab exists for component: CPKMB    Imaging:  []                           I have personally reviewed the patients radiographs and reports:        Results from Hospital Encounter encounter on 01/03/17   XR CHEST PORT   Narrative INDICATION:  Left lower lobe pneumonia    COMPARISON: 1/5/2017. FINDINGS: An AP radiograph of the chest demonstrates persistent left lower lobe  airspace disease. Lungs otherwise clear. .  The cardiac and mediastinal contours  and pulmonary vascularity are normal.  The bones and soft tissues are within  normal limits. Impression IMPRESSION: Persistent left lower lobe airspace disease. No results found for this or any previous visit.     Les Perez MD

## 2017-01-07 NOTE — PROGRESS NOTES
0745  AM assessment complete. Pt c/o breathing difficulty and feeling weak  1100  Pt able to sit up in chair and eat breakfast.  Pt requested to walk around in room. Assisted pt to walk in room, then back to bed. Pt unable to walk far before becoming SOB, although maintaining O2 sats 90's.  1700  Pt up in chair for late lunch. Pt sat up for over an hour, then assisted to sink to brush teeth and wash. Pt SOB, but maintaining O2 sats. Pt tolerating 1-2LPM NC.

## 2017-01-08 NOTE — PROGRESS NOTES
1545: TRANSFER - IN REPORT:    Verbal report received from Iris(name) on [address]  being received from CCU(unit) for routine progression of care      Report consisted of patients Situation, Background, Assessment and   Recommendations(SBAR). Information from the following report(s) SBAR, Kardex and Recent Results was reviewed with the receiving nurse. Opportunity for questions and clarification was provided. Assessment completed upon patients arrival to unit and care assumed.        1750: Primary Nurse Lacey Fan RN and Russel Jay RN performed a dual skin assessment on this patient No impairment noted except cath site to R groin  Mohsen score is 19

## 2017-01-08 NOTE — PROGRESS NOTES
[facility] Consult Service Progress NOTE  Pulmonary, Critical Care, and Sleep Medicine    Name: Carol Gongora MRN: [id number]   : 1945 Hospital: [facility]   Date: 2017  Admission Date: 1/3/2017       SOCIAL HISTORY: She lives [place] her own home [place] the [place]. 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 Smokes 1-2 packs per day for 50 years. FAMILY HISTORY: Negative for heart disease  PCP: Minerva Jimenez MD      Hospital Day: 17: Has exertional dyspnea, mild cough, clear sputum present. 17: No acute complaints. Still feels weak, has a congested cough. No issues last night.  less SOb. Still congested; had a recent dental infection right lower jaw but could not get to dentist;  that is no longer painful. Discussed Pulmonary rehab or SNF; pt would prefer local facility     extubated on ; severe FERREIRA. Has been independent living alone [place] the woods in [address]. Son lives in [facility]; apprehensive about moving. No loca [employer] support. Does own shopping. Has nebs at home. IMPRESSION:   · Respiratory insufficiency, was intubated for Cardiac Cath, has been  extubated, weaned to [place] oxygen. · Severe chronic obstructive pulmonary disease. apparently not on home O2 but on multiple inhalers  · Acute non-ST elevation myocardial infarction  · Severe hypophosphatemia with hypokalemia  · Fever  · LLL atelectasis vs aspiration penumonia on empiric abx   · UGIB likely from stress gastritis- initiall Hgb suggests some chronic hypoxia at home  · Acute unstable angina pectoris, class 4.   · Coronary artery atherosclerosis. · Status post remote myocardial infarction in . · Status post remote percutaneous coronary intervention with unknown coronary artery in [facility] in . · Hyperlipidemia.       RECOMMENDATIONS/PLAN:   · May transfer to telemetry, day 3 waiting for bed. · Mobilize  · If she can rally would suggest inpt Pulmonary rehab or SNF  · Empiric abx for possible aspiration, can change to po prior to transfer to Rehab. · Pulmonary toilet  · Pacemaker per Cardiology  · PPI  · Will need to assess O2 needs prior to discharge  · Smoking cessation counseling  · DVT prophylaxis when able  · Will be available to assist in medical management while in the CCU pending disposition     Code: Full Code          Vital Signs: Intake/Output: Intake/Output:   Visit Vitals    /74    Pulse 95    Temp 98.3 °F (36.8 °C)    Resp 27    Ht 4' 11\" (1.499 m)    Wt 30 kg (66 lb 2.2 oz)    LMP  (Exact Date)    SpO2 97%    BMI 13.36 kg/m2      O2 Device: Nasal cannula  O2 Flow Rate (L/min): 2 l/min  Temp (24hrs), Av.7 °F (37.1 °C), Min:98.3 °F (36.8 °C), Max:99.2 °F (37.3 °C)     Intake/Output Summary (Last 24 hours) at 17 1114  Last data filed at 17 1000   Gross per 24 hour   Intake              800 ml   Output              700 ml   Net              100 ml    Last shift:       07 -  1900  In: 290 [P.O.:240; I.V.:50]  Out: -   Last 3 shifts:  1901 -  0700  In: 1080 [P.O.:720; I.V.:360]  Out: 1285 [Urine:1285]         Telemetry:    normal sinus rhythm    Physical Exam:    General: petite WF SOB; non-toxic, ill and cachectic/lean   HEENT: dry; no thrush   Neck: No abnormally enlarged lymph nodes. Chest: petite, nomi   Lungs: clear to ausculation. Good air movement.     Heart: Regular rate and rhythm or S1S2 present   Abdomen: soft, non-tender, without masses or organomegaly   : Box   Extremity: negative, cyanosis, clubbing;    Neuro: alert   Psych: calmer and fair insight   Skin: Skin unremarkable;   Pulses: Bilateral, Radial, 2+ Normal upper and lower extremity pulses   Capillary refill: normal well perfused;      DATA:    MAR reviewed and pertinent medications noted or modified as needed    MEDS:   Current Facility-Administered Medications   Medication    potassium chloride (KLOR-CON) packet 40 mEq    valsartan (DIOVAN) tablet 20 mg    senna-docusate (PERICOLACE) 8.6-50 mg per tablet 1 Tab    atorvastatin (LIPITOR) tablet 40 mg    guaiFENesin (ROBITUSSIN) 100 mg/5 mL oral liquid 100 mg    albuterol-ipratropium (DUO-NEB) 2.5 MG-0.5 MG/3 ML    pantoprazole (PROTONIX) tablet 40 mg    sodium chloride (NS) flush 5-10 mL    sodium chloride (NS) flush 5-10 mL    LORazepam (ATIVAN) tablet 1 mg    aspirin chewable tablet 81 mg    albuterol-ipratropium (DUO-NEB) 2.5 MG-0.5 MG/3 ML    piperacillin-tazobactam (ZOSYN) 2.25 g in 0.9% sodium chloride (MBP/ADV) 50 mL    fluticasone (FLONASE) 50 mcg/actuation nasal spray 2 Spray    fluticasone-vilanterol (BREO ELLIPTA) 100mcg-25mcg/puff    umeclidinium (INCRUSE ELLIPTA) 62.5 mcg/actuation    levothyroxine (SYNTHROID) tablet 50 mcg    levothyroxine (SYNTHROID) tablet 75 mcg    clopidogrel (PLAVIX) tablet 75 mg    metoprolol tartrate (LOPRESSOR) tablet 12.5 mg          Labs:  Recent Labs      01/06/17   0353   WBC  12.8*   HGB  9.3*   PLT  179   APTT  29.8     Recent Labs      01/08/17   0411  01/07/17   0429  01/06/17   0353   NA  146*  142  143   K  3.2*  3.4*  3.0*   CL  109*  108  107   CO2  24  24  26   GLU  114*  71  93   BUN  12  15  17   CREA  0.65  0.56  0.65   CA  7.9*  7.8*  7.8*   MG  2.3  2.3  2.1   PHOS   --   2.1*  1.1*   ALB   --    --   2.5*   SGOT   --    --   56*   ALT   --    --   31     Recent Labs      01/06/17   0536   PH  7.45   PCO2  39   PO2  105*   HCO3  27*     Recent Labs      01/06/17   0536   PH  7.45   PCO2  39   PO2  105*   HCO3  27*     No results for input(s): CPK, CKNDX, TROIQ in the last 72 hours.     No lab exists for component: CPKMB    Imaging:  []                           I have personally reviewed the patients radiographs and reports:        Results from Hospital Encounter encounter on 01/03/17   XR CHEST PORT   Narrative INDICATION:  Left lower lobe pneumonia    COMPARISON: 1/5/2017. FINDINGS: An AP radiograph of the chest demonstrates persistent left lower lobe  airspace disease. Lungs otherwise clear. .  The cardiac and mediastinal contours  and pulmonary vascularity are normal.  The bones and soft tissues are within  normal limits. Impression IMPRESSION: Persistent left lower lobe airspace disease. No results found for this or any previous visit.     Zofia Gonzalez MD

## 2017-01-08 NOTE — PROGRESS NOTES
[id number]  AM assessment complete. Pt assisted to [facility], then to chair for breakfast.  Pt requested pericolace. States she is feeling full. Pt used BSC once more prior to being assisted back to bed. No other complaints. Pt dyspneic with exertion.   1600  Report called to [facility] for transfer to [facility]

## 2017-01-08 NOTE — PROGRESS NOTES
1910: Received report from [address] [place] [address]   2000: Assessment complete. Patient alert and oriented. Resting comfortably. Denies pain or respiratory distress. Sats 95%  0000: Reassessment complete. BSC to urinate. Patient became SOB, recovered within a few minutes. Back to bed, resting comfortably. Denies pain. 0400: Reassessment complete. Labs sent. No changes or complaints at this time. 0725: Bedside and Verbal shift change report given to [facility] (oncoming nurse) by Helga Schaefer (offgoing nurse). Report included the following information SBAR, Kardex and Intake/Output.

## 2017-01-08 NOTE — PROGRESS NOTES
Cardiology Progress Note  1/8/2017    Admit Date: 1/3/2017  Admit Diagnosis: NSTEMI (non-ST elevated myocardial infarction) ([facility] [address]  CC:  None currently       Problem List (as per Dr Gabe Stephens):       1. NSTEMI  2. Status post cath with rotational atherectomy and placement of 3 GURWINDER in the RCA  3. CAD s/p prior RCA stenting  4. Status post intubation for patient safety during cath -- now extubated on BiPAP prn and O2  5. Status post temporary pacer via RCFV -- removed 1/4/17  6. UGIB -- resolved  7. LLL aspiration/atelectasis  8. Fever  9. Severe COPD  10. Hyperlipidemia  11. Hypokalemia  12. Hypophosphatemia      Assessment/Plan (as per Dr Gabe Stephens):      1. Continue ASA and plavix  2. Continue atorvastatin 40mg  3. Continue metoprolol 12.5mg q12; she is tolerating this fine (breathing better)  4. If BPs hold; would like to start ACEi this weekend  5. Continue synthroid  6. Antibiotics, nebs, etc per pulmonary; appreciate assistance. Inpatient pulmonary rehab vs SNF -- limiting factor now is COPD and electrolytes  7. Transfer to tele when a bed is available. Will likely need to be here through the weekend; will plan for d/c early week  8. Stop smoking     1/7: Stable dyspnea; No CP. To tele; add low dose arb. Cont k/phos Rx. On ABx for LLL infiltrate. 1/8: No CP; awaiting tele bed; tolerating low dose arb; needs more K+ (got extra 40 this am; extra 40 tonight). For other plans, see orders.   High complexity decision making was performed  X Yes   High-risk of decompensation with multiple organ involvement X Yes   Hospital problem list   Active Hospital Problems    Diagnosis Date Noted    NSTEMI (non-ST elevated myocardial infarction) ([facility] [address] 01/03/2017                                                         Subjective:  Patient reports  []   nothing; unable to communicate    []   intubated   Chest pain X none  consistent with:  Non-cardiac CP         Atypical CP     None now  On going Anginal CP     Dyspnea  none  at rest  with exertion        x Improved vs admit x Unchanged from yesterday  worse              PND X none  overnight       Orthopnea X none  improved  unchanged  worse   Presyncope X none  improved  unchanged  worse   Ambulated in hallway without symptoms   Yes   Ambulated in room without symptoms  Yes     ROS Hematuria:  Yes X No Dysuria:  Yes X No                (2+  other systems) Cough: Yes X No Sputum: Yes X No                 BRBPR:  Yes X No Melena: Yes X No   No change in family and social history from H&P/Consult note.   Objective:    Physical Exam:  24 hr VS reviewed, overall VSSAF  Temp (24hrs), Av.8 °F (37.1 °C), Min:98.3 °F (36.8 °C), Max:99.2 °F (37.3 °C)    Patient Vitals for the past 8 hrs:   Pulse   17 0700 78   17 0600 92   17 0535 83   17 0500 97   17 0400 83   17 0300 80   17 0200 86    Patient Vitals for the past 8 hrs:   Resp   17 0700 20   17 0600 21   17 0535 24   17 0500 21   17 0400 23   17 0300 20   17 0200 27    Patient Vitals for the past 8 hrs:   BP   17 0700 110/77   17 0600 117/73   17 0535 111/66   17 0500 109/66   17 0400 103/59   17 0300 103/68   17 0200 104/65          Intake/Output Summary (Last 24 hours) at 17 0920  Last data filed at 17 0543   Gross per 24 hour   Intake              870 ml   Output              740 ml   Net              130 ml     General: x WD,WN x Elderly  Cachetic x NAD     Agitated  Lethargic  Arousable  Obtunded     Sedated  On Bipap  Intubated                ENT/Palate: X WNL  Dry MM  anicteric                Respiratory:  CTA  Nl resp effort  Increased effort  No significant change    x Rhonchi @ left base is trivial now  rales  improved  worse              Cardiovasc: X RRR  IRRR X Nl S1, S2 x No rub     No murmur X No new murmur  Murmur c/w: x No gallop    x No edema  BLE edema:+ RLE edema:+  LLE edema:+     Edema less  Edema more  Edema same  Edema worse    X Nl JVP  Elevated JVP  JVP same  JVP worse    X Carotid wnl x abd aorta not palpated X no peripheral emboli noted                GI: X abd soft x nondistended X BS present X No organo- megaly noted              Skin: X Warm, dry  Cold extremites                  Neuro: x A/O x Grossly non- focal  Obtunded  Sedated     Lethargic  Arousable  intubated       cath site intact w/o hematoma or new bruit; distal pulse unchanged  Yes   Data Review:     Telemetry independently reviewed x sinus  chronic afib  parox afib  NSVT     ECG independently reviewed  NSR  afib  no significant changes  NSST-Tw chgs   x no new ECG provided for review   Lab results reviewed as noted below. Current medications reviewed as noted below. Recent Labs      01/06/17   0536   PH  7.45   PCO2  39   PO2  105*     No results for input(s): CPK, CKMB in the last 72 hours. No lab exists for component: TROPONINI  Recent Labs      01/08/17   0411  01/07/17   0429  01/06/17   0353   NA  146*  142  143   K  3.2*  3.4*  3.0*   CL  109*  108  107   CO2  24  24  26   BUN  12  15  17   CREA  0.65  0.56  0.65   GLU  114*  71  93   PHOS   --   2.1*  1.1*   CA  7.9*  7.8*  7.8*   ALB   --    --   2.5*   WBC   --    --   12.8*   HGB   --    --   9.3*   HCT   --    --   27.7*   PLT   --    --   179     Recent Labs      01/06/17   0353   SGOT  56*   AP  72   TBILI  0.6   TP  5.8*   ALB  2.5*   GLOB  3.3     Recent Labs      01/06/17   0353   APTT  29.8      No results for input(s): FE, TIBC, PSAT, FERR in the last 72 hours.    Lab Results   Component Value Date/Time    Glucose (POC) 75 01/07/2017 06:20 AM    Glucose (POC) 134 01/05/2017 11:51 AM       Current Facility-Administered Medications   Medication Dose Route Frequency    potassium chloride (K-DUR, KLOR-CON) SR tablet 40 mEq  40 mEq Oral NOW    valsartan (DIOVAN) tablet 20 mg  20 mg Oral DAILY    senna-docusate (PERICOLACE) 8.6-50 mg per tablet 1 Tab  1 Tab Oral DAILY PRN    atorvastatin (LIPITOR) tablet 40 mg  40 mg Oral QHS    guaiFENesin (ROBITUSSIN) 100 mg/5 mL oral liquid 100 mg  100 mg Oral QID    albuterol-ipratropium (DUO-NEB) 2.5 MG-0.5 MG/3 ML  3 mL Nebulization Q3H PRN    pantoprazole (PROTONIX) tablet 40 mg  40 mg Oral ACB    sodium chloride (NS) flush 5-10 mL  5-10 mL IntraVENous Q8H    sodium chloride (NS) flush 5-10 mL  5-10 mL IntraVENous PRN    LORazepam (ATIVAN) tablet 1 mg  1 mg Oral BID PRN    aspirin chewable tablet 81 mg  81 mg Oral DAILY    albuterol-ipratropium (DUO-NEB) 2.5 MG-0.5 MG/3 ML  3 mL Nebulization Q4H RT    piperacillin-tazobactam (ZOSYN) 2.25 g in 0.9% sodium chloride (MBP/ADV) 50 mL  2.25 g IntraVENous Q6H    fluticasone (FLONASE) 50 mcg/actuation nasal spray 2 Spray  2 Spray Both Nostrils DAILY    fluticasone-vilanterol (BREO ELLIPTA) 100mcg-25mcg/puff  1 Puff Inhalation DAILY    umeclidinium (INCRUSE ELLIPTA) 62.5 mcg/actuation  1 Puff Inhalation DAILY    levothyroxine (SYNTHROID) tablet 50 mcg  50 mcg Oral EVERY TUES,THUR,SAT,SUN    levothyroxine (SYNTHROID) tablet 75 mcg  75 mcg Oral Q MON, WED & FRI    clopidogrel (PLAVIX) tablet 75 mg  75 mg Oral DAILY    metoprolol tartrate (LOPRESSOR) tablet 12.5 mg  12.5 mg Oral Q12H    mupirocin (BACTROBAN) 2 % ointment   Both Nostrils BID         Fang Gallego MD

## 2017-01-09 NOTE — PROGRESS NOTES
S/w pt re:discharge planning, she has talked it over with her son and they have chosen [facility], referral has been sent via [employer]. Will wait to hear from [facility]. CM will continue to follow.     Lester Hrenandez RN    Ext [phone]

## 2017-01-09 NOTE — PROGRESS NOTES
[facility] Consult Service Progress NOTE  Pulmonary, Critical Care, and Sleep Medicine    Name: Danielle Sun MRN: [id number]   : 1945 Hospital: [address]   Date: 2017  Admission Date: 1/3/2017       Hospital Day: 7  More anxiety than anything else  Still c/o FERREIRA     IMPRESSION:   · Respiratory insufficiency, was intubated for Cardiac Cath, has been  extubated, weaned to NC oxygen. · Severe chronic obstructive pulmonary disease. apparently not on home O2 but on multiple inhalers  · Acute non-ST elevation myocardial infarction  · Severe hypophosphatemia with hypokalemia  · Fever  · LLL atelectasis vs aspiration penumonia on empiric abx   · UGIB likely from stress gastritis- initiall Hgb suggests some chronic hypoxia at home  · Acute unstable angina pectoris, class 4.   · Coronary artery atherosclerosis. · Status post remote myocardial infarction in . · Status post remote percutaneous coronary intervention with unknown coronary artery in [facility] in . · Hyperlipidemia. RECOMMENDATIONS/PLAN:   · Wean O2.   · Mobilize  · Jet nebs  · Continue inhalers, LAMA, LABA, ICS  · Recommend inpt Pulmonary rehab or SNF  · Empiric abx for possible aspiration, change to po prior today.    · Pulmonary toilet  · Pacemaker per Cardiology  · PPI  · Will need to assess O2 needs prior to discharge  · Smoking cessation counseling  · DVT prophylaxis when able  · Will be available to assist in medical management while in the CCU pending disposition     Code: Full Code          Vital Signs: Intake/Output: Intake/Output:   Visit Vitals    /77 (BP Patient Position: Sitting)    Pulse 88    Temp 97.7 °F (36.5 °C)    Resp 20    Ht 4' 11\" (1.499 m)    Wt 30 kg (66 lb 2.2 oz)    LMP  (Exact Date)    SpO2 94%    BMI 13.36 kg/m2      O2 Device: Nasal cannula  O2 Flow Rate (L/min): 3 l/min  Temp (24hrs), Av.3 °F (36.8 °C), Min:97.7 °F (36.5 °C), Max:99.1 °F (37.3 °C)     Intake/Output Summary (Last 24 hours) at 01/09/17 0840  Last data filed at 01/09/17 0312   Gross per 24 hour   Intake              700 ml   Output              250 ml   Net              450 ml    Last shift:         Last 3 shifts: 01/07 1901 - 01/09 0700  In: 800 [P.O.:600; I.V.:200]  Out: 700 [Urine:700]         Telemetry:    normal sinus rhythm    Physical Exam:    General: petite WF SOB; non-toxic,no distress   HEENT: dry; no thrush   Neck: No abnormally enlarged lymph nodes. Chest: petite, nomi   Lungs: clear to ausculation. Good air movement.  NO WHEEZING   Heart: Regular rate and rhythm or S1S2 present   Abdomen: soft, non-tender, without masses or organomegaly   : Box   Extremity: negative, cyanosis, clubbing;    Neuro: alert   Psych: calmer and fair insight   Skin: Skin unremarkable;   Pulses: Bilateral, Radial, 2+ Normal upper and lower extremity pulses   Capillary refill: normal well perfused;      DATA:    MAR reviewed and pertinent medications noted or modified as needed    MEDS:   Current Facility-Administered Medications   Medication    albuterol-ipratropium (DUO-NEB) 2.5 MG-0.5 MG/3 ML    ALPRAZolam (XANAX) tablet 1 mg    valsartan (DIOVAN) tablet 20 mg    senna-docusate (PERICOLACE) 8.6-50 mg per tablet 1 Tab    atorvastatin (LIPITOR) tablet 40 mg    guaiFENesin (ROBITUSSIN) 100 mg/5 mL oral liquid 100 mg    albuterol-ipratropium (DUO-NEB) 2.5 MG-0.5 MG/3 ML    pantoprazole (PROTONIX) tablet 40 mg    sodium chloride (NS) flush 5-10 mL    sodium chloride (NS) flush 5-10 mL    aspirin chewable tablet 81 mg    piperacillin-tazobactam (ZOSYN) 2.25 g in 0.9% sodium chloride (MBP/ADV) 50 mL    fluticasone (FLONASE) 50 mcg/actuation nasal spray 2 Spray    fluticasone-vilanterol (BREO ELLIPTA) 100mcg-25mcg/puff    umeclidinium (INCRUSE ELLIPTA) 62.5 mcg/actuation    levothyroxine (SYNTHROID) tablet 50 mcg    levothyroxine (SYNTHROID) tablet 75 mcg    clopidogrel (PLAVIX) tablet 75 mg    metoprolol tartrate (LOPRESSOR) tablet 12.5 mg          Labs:  No results for input(s): WBC, HGB, PLT, INR, APTT, HGBEXT, PLTEXT, HGBEXT, PLTEXT in the last 72 hours. No lab exists for component: FIB, DDMER, INREXT, INREXT  Recent Labs      01/09/17   0205  01/08/17   0411  01/07/17   0429   NA  145  146*  142   K  3.7  3.2*  3.4*   CL  109*  109*  108   CO2  27  24  24   GLU  92  114*  71   BUN  10  12  15   CREA  0.72  0.65  0.56   CA  8.5  7.9*  7.8*   MG  2.2  2.3  2.3   PHOS   --    --   2.1*     No results for input(s): PH, PCO2, PO2, HCO3, FIO2 in the last 72 hours. No results for input(s): PH, PCO2, PO2, HCO3, FIO2 in the last 72 hours. No results for input(s): CPK, CKNDX, TROIQ in the last 72 hours. No lab exists for component: CPKMB    Imaging:  []                           I have personally reviewed the patients radiographs and reports:      No results found for this or any previous visit.     Godfrey Silva MD

## 2017-01-09 NOTE — CARDIO/PULMONARY
C/P REHAB:  Chart reviewed. NSTEMI, S/P PCI/stents. Pt with severe COPD and is a current smoker. Met with patient. No family present. Cardiac teaching folder at bedside. Reviewed post cath restrictions/limitations as well as medication compliance with anti-platelet meds. Reviewed NSTEMI instructions/restrictions including signs/symptoms of angina/MI, the importance of prompt treatment, risk factors, graduated activity according to instructions and heart healthy diet. Reviewed risk factors and counseled on cigarette smoking. Pt had gotten down to 3-4 cigarettes on Chantix - did not want to use nicotine containing products. Pt does not have much of an appetite and was advised to use Boost or Ensure instead of fruits cups and ice cream for better nutrition. Also provided handouts on COPD and counseled on avoiding sick people, washing her hands, getting flu shot, eating smaller meals and energy conservation. Pt was talkative and repeatedly talked about scheduling a colonoscopy around the holidays. Pt had to be redirected on many occasions. Pt encouraged to read cardiac teaching folder literature for follow up visit and questions. Pt indicated basic understanding. Will continue to follow for education and support.

## 2017-01-09 NOTE — PROGRESS NOTES
Progress Note      1/9/2017 9:20 AM  NAME: Luis Medina   MRN:  [id number]   Admit Diagnosis: NSTEMI (non-ST elevated myocardial infarction) [facility])      Problem List:      1. NSTEMI  2. Status post cath with rotational atherectomy and placement of 3 GURWINDER in the RCA  3. CAD s/p prior RCA stenting  4. Status post intubation for patient safety during cath -- now extubated on BiPAP prn and O2  5. Status post temporary pacer via RCFV -- removed 1/4/17  6. UGIB -- resolved, 2/2 stress  7. LLL aspiration/atelectasis  8. Fever  9. Severe COPD  10. Hyperlipidemia  11. Hypokalemia -- resolving  12. Hypophosphatemia -- improving     Assessment/Plan:     1. Continue ASA and plavix  2. Continue atorvastatin 40mg  3. Continue metoprolol 12.5mg q12  4. Continue valsartan 20mg  5. Continue synthroid  6. Add standing dose of KCl 20mEq daily  7. Antibiotics, nebs, etc per pulmonary; appreciate assistance. Inpatient pulmonary rehab vs SNF   8. Inpatient pulmonary rehab consult  9. Stop smoking  10. Can be discharged once either SNF or Pulm Rehab established         [x]       High complexity decision making was performed in this patient at high risk for decompensation with multiple organ involvement. Subjective:     Luis Medina with is better; breathing difficult with talking, eating, and exertion. No CP  Discussed with RN events overnight. Review of Systems:    Symptom Y/N Comments  Symptom Y/N Comments   Fever/Chills N   Chest Pain N    Poor Appetite N   Edema N    Cough N   Abdominal Pain N    Sputum N   Joint Pain N    SOB/FERREIRA N   Pruritis/Rash N    Nausea/vomit N   Tolerating PT/OT Y    Diarrhea N   Tolerating Diet Y    Constipation N   Other       Could NOT obtain due to:      Objective:      Physical Exam:    Last 24hrs VS reviewed since prior progress note.  Most recent are:    Visit Vitals    /77 (BP Patient Position: Sitting)    Pulse 88    Temp 97.7 °F (36.5 °C)    Resp 20    Ht 4' 11\" (1.499 m)    Wt 30 kg (66 lb 2.2 oz)    LMP  (Exact Date)    SpO2 94%    BMI 13.36 kg/m2       Intake/Output Summary (Last 24 hours) at 01/09/17 1014  Last data filed at 01/09/17 1148   Gross per 24 hour   Intake              460 ml   Output              250 ml   Net              210 ml        General Appearance: Well developed, well nourished, alert, no acute distress. Ears/Nose/Mouth/Throat: Hearing grossly normal.  Neck: Supple. Chest: Generalized decreased air movement  Cardiovascular: Regular rate and rhythm, S1S2 normal, no murmur. Abdomen: Soft, non-tender, bowel sounds are active. Extremities: No edema bilaterally. Skin: Warm and dry. [x]         Post-cath site without hematoma, bruit, tenderness, or thrill. Distal pulses intact. PMH/SH reviewed - no change compared to H&P    Data Review    Telemetry: sinus tach    EKG:   []  No new EKG for review    Lab Data Personally Reviewed:    No results for input(s): WBC, HGB, HCT, PLT, HGBEXT, HCTEXT, PLTEXT, HGBEXT, HCTEXT, PLTEXT in the last 72 hours. No results for input(s): INR, PTP, APTT in the last 72 hours. No lab exists for component: INREXT, INREXT   Recent Labs      01/09/17   0205  01/08/17   0411  01/07/17   0429   NA  145  146*  142   K  3.7  3.2*  3.4*   CL  109*  109*  108   CO2  27  24  24   BUN  10  12  15   CREA  0.72  0.65  0.56   GLU  92  114*  71   CA  8.5  7.9*  7.8*   MG  2.2  2.3  2.3     No results for input(s): CPK, CKNDX, TROIQ in the last 72 hours. No lab exists for component: CPKMB  No results found for: CHOL, CHOLX, CHLST, CHOLV, HDL, LDL, DLDL, LDLC, DLDLP, TGL, TGLX, TRIGL, TRIGP, CHHD, CHHDX    No results for input(s): SGOT, GPT, AP, TBIL, TP, ALB, GLOB, GGT, AML, LPSE in the last 72 hours. No lab exists for component: AMYP, HLPSE  No results for input(s): PH, PCO2, PO2 in the last 72 hours.     Medications Personally Reviewed:    Current Facility-Administered Medications   Medication Dose Route Frequency    albuterol-ipratropium (DUO-NEB) 2.5 MG-0.5 MG/3 ML  3 mL Nebulization 02HWA RT    ALPRAZolam (XANAX) tablet 1 mg  1 mg Oral QID PRN    amoxicillin-clavulanate (AUGMENTIN) 500-125 mg per tablet 1 Tab  1 Tab Oral Q12H    potassium chloride SR (KLOR-CON 10) tablet 20 mEq  20 mEq Oral DAILY    valsartan (DIOVAN) tablet 20 mg  20 mg Oral DAILY    senna-docusate (PERICOLACE) 8.6-50 mg per tablet 1 Tab  1 Tab Oral DAILY PRN    atorvastatin (LIPITOR) tablet 40 mg  40 mg Oral QHS    guaiFENesin (ROBITUSSIN) 100 mg/5 mL oral liquid 100 mg  100 mg Oral QID    albuterol-ipratropium (DUO-NEB) 2.5 MG-0.5 MG/3 ML  3 mL Nebulization Q3H PRN    pantoprazole (PROTONIX) tablet 40 mg  40 mg Oral ACB    sodium chloride (NS) flush 5-10 mL  5-10 mL IntraVENous Q8H    sodium chloride (NS) flush 5-10 mL  5-10 mL IntraVENous PRN    aspirin chewable tablet 81 mg  81 mg Oral DAILY    fluticasone (FLONASE) 50 mcg/actuation nasal spray 2 Spray  2 Spray Both Nostrils DAILY    fluticasone-vilanterol (BREO ELLIPTA) 100mcg-25mcg/puff  1 Puff Inhalation DAILY    umeclidinium (INCRUSE ELLIPTA) 62.5 mcg/actuation  1 Puff Inhalation DAILY    levothyroxine (SYNTHROID) tablet 50 mcg  50 mcg Oral EVERY TUES,THUR,SAT,SUN    levothyroxine (SYNTHROID) tablet 75 mcg  75 mcg Oral Q MON, WED & FRI    clopidogrel (PLAVIX) tablet 75 mg  75 mg Oral DAILY    metoprolol tartrate (LOPRESSOR) tablet 12.5 mg  12.5 mg Oral Q12H         Anju Rivera MD

## 2017-01-09 NOTE — PROGRESS NOTES
[address] SHIFT NURSING NOTE    Bedside shift change report given to Elizabeth (oncoming nurse) by Chantale Rollins (offgoing nurse). Report included the following information SBAR, Kardex, Intake/Output, MAR and Recent Results. SHIFT SUMMARY:         Admission Date 1/3/2017   Admission Diagnosis NSTEMI (non-ST elevated myocardial infarction) ([facility] [address]   Consults IP CONSULT TO CARDIOLOGY  IP CONSULT TO PULMONOLOGY        Consults   [x] PT   [x] OT   [] Speech   [] Palliative      [] Hospice    [x] Case Management   [] None   Cardiac Monitoring   [x] Yes   [] No     Antibiotics   [x] Yes   [] No   GI Prophylaxis  (Ex: Protonix, Pepcid, etc,.)   [x] Yes   [] No          DVT Prophylaxis   SCDs:             Eder stockings:         [x] Medication (Ex: Lovenox, Eliquis,  Heparin, etc..)   [] Contraindicated   [] None       Urinary Catheter [REMOVED] Urinary Catheter 01/03/17 Box-Criteria for Appropriate Use: Obstruction/retention     [REMOVED] Urinary Catheter 01/03/17 Box-Urine Output (mL): 40 ml     LDAs               Peripheral IV 01/07/17 Left Forearm (Active)   Site Assessment Clean, dry, & intact 1/8/2017  7:54 PM   Phlebitis Assessment 0 1/8/2017  7:54 PM   Infiltration Assessment 0 1/8/2017  7:54 PM   Dressing Status Clean, dry, & intact 1/8/2017  7:54 PM   Dressing Type Transparent 1/8/2017  7:54 PM   Hub Color/Line Status Pink; Infusing 1/8/2017  7:54 PM                      I/Os   Intake/Output Summary (Last 24 hours) at 01/08/17 1956  Last data filed at 01/08/17 1954   Gross per 24 hour   Intake              560 ml   Output              700 ml   Net             -140 ml         Activity Level Activity Level: Up with Assistance     Activity Assistance: Partial (one person)   Diet Active Orders   Diet    DIET CARDIAC Regular      Purposeful Rounding every 1-2 hour?    [x] Yes    Germain Score  Total Score: 4   Bed Alarm (If score 3 or >)   [x] Yes    [] Refused (See signed refusal form in chart)   Mohsen Score  Mohsen Score: 19       Mohsen Score (if score 14 or less)   [] PMT consult   [] Nutrition consult   [] Wound Care consult      []  Specialty bed         Influenza Vaccine Received Flu Vaccine for Current Season (usually Sept-March): No    Patient/Guardian Refused (Notify MD): Yes          Needs prior to discharge:   Home O2 required:    [x] Yes   [] No     If yes, how much O2 required?  ?    Other:    Last Bowel Movement Date: 01/08/17        POST-OP SURGICAL VATS   [] Yes   [x] No     Incentive Spirometer:   [] Yes   [] Refused   Coughing and deep breathing:     [] Yes   [] Refused   Oral care:     [] Yes   [] Refused   Understanding (patient education):     [] Yes   [] Refused   Getting out of bed Number times ambulated in hallway past shift:    Number of times OOB to chair past shift:     Head of bed elevation:     [] Yes   [] Refused      Readmission Risk Assessment Tool Score Medium Risk            19       Total Score        3 Relationship with PCP    3 Patient Length of Stay > 5    4 More than 1 Admission in calendar year    5 Patient Insurance is Medicare, Medicaid or Self Pay    4 Charlson Comorbidity Score        Criteria that do not apply:    Patient Living Status       Expected Length of Stay 2d 7h   Actual Length of Stay 5

## 2017-01-10 NOTE — CDMP QUERY
Severe protein calorie malnutrition in setting of BMI 13.36, smoker, severe COPD,  requiring nutritional supp  =>Other Explanation of clinical findings  =>Unable to Determine (no explanation of clinical findings)    The medical record reflects the following clinical findings, treatment, and risk factors:    Risk Factors: 66 WF w/hx MI, severe COPD/smoker, presented w/CP, found to have NSTEMI  Clinical Indicators: BMI 13.36, TP 6.2 w/Albumin 3.2 dropped to TP 5.8, Albumin 2.5  Treatment:  Protonix IV, nutritional supp    Please clarify and document your clinical opinion in the progress notes and discharge summary including the definitive and/or presumptive diagnosis, (suspected or probable), related to the above clinical findings. Please include clinical findings supporting your diagnosis.     Thank Jailene Philippe   [phone]

## 2017-01-10 NOTE — PROGRESS NOTES
Nutrition Services      Nutrition Screen:  Wt Readings from Last 10 Encounters:   01/03/17 30 kg (66 lb 2.2 oz)   12/22/16 37.3 kg (82 lb 4 oz)   09/27/16 37.2 kg (82 lb)   06/28/16 37.6 kg (83 lb)   10/04/15 36.3 kg (80 lb)   12/05/13 39.2 kg (86 lb 6 oz)     Body mass index is 13.36 kg/(m^2). Supplements: Ensure                        __x___ ordered ______  declined. __ __  Pt is nutritionally stable at this time, will rescreen in 7 days. __ __    Pt is at nutritional risk and will be rescreened in  days. __x __  Pt is at moderate or high nutritional risk, will refer to RD for assessment.        Grant Benoit  Dietetic Technician, Registered

## 2017-01-10 NOTE — CARDIO/PULMONARY
Cardiopulmonary Rehab Nursing Entry:        Chart Reviewed. Admitting diagnosis of NSTEMI S/p PCI with stents. Status post temporary pacer via RCFV -- removed 1/4/17. Pt started on plavix      Pt with severe COPD       PMH significant for:  1. MI in 2010 with stent  2. Bipolar Dz  3. Hyperlipidemia      Pt is a current day smoker. Met with pt who has briefly looked at educational hand-outs. She is very verbal about d/c plans and concerns. She is currently looking into short-term placement on d/c or perhaps long-term. She has completed cardiac rehab in [facility] and is interested in working with them again if she is able to return to her home. She seems overwhelmed with her multiple health conditions and getting prescriptions when she is discharged. Pt allowed to verbalize concerns and emotional support provided. Will continue to follow to support and educate pt.

## 2017-01-10 NOTE — PROGRESS NOTES
Problem: Mobility Impaired (Adult and Pediatric)  Goal: *Acute Goals and Plan of Care (Insert Text)  Physical Therapy Goals  Initiated 1/5/2017  1. Patient will move from supine to sit and sit to supine , scoot up and down and roll side to side in bed with supervision/set-up within 7 day(s). 2. Patient will transfer from bed to chair and chair to bed with supervision/set-up using the least restrictive device within 7 day(s). 3. Patient will perform sit to stand with supervision/set-up within 7 day(s). 4. Patient will ambulate with supervision/set-up for 75 feet with the least restrictive device within 7 day(s). PHYSICAL THERAPY TREATMENT  Patient: Milena Rosas (5 y.o. female)  Date: 1/10/2017  Diagnosis: NSTEMI (non-ST elevated myocardial infarction) [facility]) <principal problem not specified>       Precautions: Fall, Bed Alarm      ASSESSMENT:  Pt Coleman Hamilton presents with slowly improving functional mobility and activity tolerance on hospital day 6 of admission with NSTEMI. Pt requiring decreased assistance for ambulation and tolerates increased gait distance. She benefits from cues/redirection to minimize conversation to optimize respiratory status. Recommend inpatient pulmonary rehab to facilitate return to baseline mobility. Documentation for home O2:     ROOM AIR     AT REST    O2 SATS  91% HR  93   ROOM AIR WITH ACTIVITY 02 SATS  86% HR  98   (2    ) LITERS OF O2 WITH ACTIVITY O2 SATS  91% HR  99   (2    )LITERS OF 02 as found PATIENT LEFT COMFORTABLY  SITTING/SUPINE 02 SATS  96% HR  90            Progression toward goals:  [ ]    Improving appropriately and progressing toward goals  [X]    Improving slowly and progressing toward goals  [ ]    Not making progress toward goals and plan of care will be adjusted       PLAN:  Patient continues to benefit from skilled intervention to address the above impairments. Continue treatment per established plan of care.   Discharge Recommendations: Inpatient Rehab  Further Equipment Recommendations for Discharge:  Defer to rehab       SUBJECTIVE:   Patient stated This balance thing is new, re: perception of gait instability. OBJECTIVE DATA SUMMARY:   Critical Behavior:  Neurologic State: Alert, Appropriate for age  Orientation Level: Oriented X4  Cognition: Appropriate decision making     Functional Mobility Training:  Bed Mobility:     Supine to Sit: Additional time;Supervision  Sit to Supine: Additional time;Supervision  Scooting: Additional time;Supervision        Transfers:  Sit to Stand: Additional time;Contact guard assistance (cues for techniques repeated 2/2 trials)   Stand to Sit: Additional time;Contact guard assistance                             Balance:  Sitting: Without support  Standing: With support  Standing - Static: Good  Standing - Dynamic : Fair  Ambulation/Gait Training:  Distance (ft):  (30' with RW + 20' without device; with seated rest break)  Assistive Device: Walker, rolling;Gait belt  Ambulation - Level of Assistance: Contact guard assistance        Gait Abnormalities: Decreased step clearance        Base of Support: Widened     Speed/Brittany: Pace decreased (<100 feet/min)                                Cues for attention to direction, walker approximation, assistance for line management. Pain:  Pain Scale 1: Numeric (0 - 10)  Pain Intensity 1: 0              Activity Tolerance:   Limited by generalized fatigue; moderate dyspnea with second gait trial (no assistive device)  Please refer to the flowsheet for vital signs taken during this treatment.   After treatment:   [ ]    Patient left in no apparent distress sitting up in chair  [X]    Patient left in no apparent distress in bed  [X]    Call bell left within reach  [X]    Nursing notified  [ ]    Caregiver present  [X]    Bed alarm activated      COMMUNICATION/COLLABORATION:   The patients plan of care was discussed with: Registered Nurse Dana Scott, PT, DPT   Time Calculation: 30 mins

## 2017-01-10 NOTE — CDMP QUERY
Acute on chronic respiratory failure in setting of Severe COPD on multi inhalers requiring intubation for cath for pt safety, Jet nebs, O2  =>possible asp pneumonia  =>Unable to Determine (no explanation of clinical findings)    The medical record reflects the following clinical findings, treatment, and risk factors:    Risk Factors: 71 WF from [facility] ER w/hx MI, severe COPD/smoker, presented w/severe CP  Clinical Indicators: BMI 13.36, ABG on CPAP 40%,  w/pO2 55, 87%, placed on 4 L NC w/pCO2 34, pO2 70, 95%, inc to 5 LNC w/pO2 105, 98%  Treatment: as above, pulm cx    Please clarify and document your clinical opinion in the progress notes and discharge summary including the definitive and/or presumptive diagnosis, (suspected or probable), related to the above clinical findings. Please include clinical findings supporting your diagnosis.     Thank Keven Bernard

## 2017-01-10 NOTE — PROGRESS NOTES
[facility] Consult Service Progress NOTE  Pulmonary, Critical Care, and Sleep Medicine    Name: Tejas Friend MRN: [id number]   : 1945 Hospital: [address]   Date: 1/10/2017  Admission Date: 1/3/2017       Hospital Day: 8  More anxiety than anything else  Still c/o FERREIRA     IMPRESSION:   · Respiratory insufficiency, was intubated for Cardiac Cath, has been  extubated, weaned to NC oxygen. · Severe chronic obstructive pulmonary disease. apparently not on home O2 but on multiple inhalers  · Acute non-ST elevation myocardial infarction  · Severe hypophosphatemia with hypokalemia  · Fever  · LLL atelectasis vs aspiration penumonia on empiric abx   · UGIB likely from stress gastritis- initiall Hgb suggests some chronic hypoxia at home  · Acute unstable angina pectoris, class 4.   · Coronary artery atherosclerosis. · Status post remote myocardial infarction in . · Status post remote percutaneous coronary intervention with unknown coronary artery in [facility] in . · Hyperlipidemia. RECOMMENDATIONS/PLAN:   · Wean O2.   · Mobilize  · Jet nebs  · Continue inhalers, LAMA, LABA, ICS  · Recommend inpt Pulmonary rehab or SNF  · Empiric po abx for possible aspiration.    · Pulmonary toilet  · Pacemaker per Cardiology  · PPI  · Smoking cessation counseling  · DVT prophylaxis when able  · Will be available as needed     Code: Full Code          Vital Signs: Intake/Output: Intake/Output:   Visit Vitals    /74 (BP 1 Location: Left arm, BP Patient Position: At rest)    Pulse 70    Temp 97.9 °F (36.6 °C)    Resp 18    Ht 4' 11\" (1.499 m)    Wt 30 kg (66 lb 2.2 oz)    LMP  (Exact Date)    SpO2 98%    BMI 13.36 kg/m2      O2 Device: Nasal cannula  O2 Flow Rate (L/min): 3 l/min  Temp (24hrs), Av.5 °F (36.9 °C), Min:97.9 °F (36.6 °C), Max:99.5 °F (37.5 °C)     Intake/Output Summary (Last 24 hours) at 01/10/17 0846  Last data filed at 01/10/17 0315   Gross per 24 hour   Intake              600 ml   Output              200 ml   Net              400 ml    Last shift:         Last 3 shifts: 01/08 1901 - 01/10 0700  In: 960 [P.O.:960]  Out: 200 [Urine:200]         Telemetry:    normal sinus rhythm    Physical Exam:    General: petite WF SOB; non-toxic,no distress   HEENT: dry; no thrush   Neck: No abnormally enlarged lymph nodes. Chest: petite, nomi   Lungs: clear to ausculation. Good air movement.  NO WHEEZING   Heart: Regular rate and rhythm or S1S2 present   Abdomen: soft, non-tender, without masses or organomegaly   : Box   Extremity: negative, cyanosis, clubbing;    Neuro: alert   Psych: calmer and fair insight   Skin: Skin unremarkable;   Pulses: Bilateral, Radial, 2+ Normal upper and lower extremity pulses   Capillary refill: normal well perfused;      DATA:    MAR reviewed and pertinent medications noted or modified as needed    MEDS:   Current Facility-Administered Medications   Medication    albuterol-ipratropium (DUO-NEB) 2.5 MG-0.5 MG/3 ML    albuterol-ipratropium (DUO-NEB) 2.5 MG-0.5 MG/3 ML    ALPRAZolam (XANAX) tablet 1 mg    amoxicillin-clavulanate (AUGMENTIN) 500-125 mg per tablet 1 Tab    potassium chloride SR (KLOR-CON 10) tablet 20 mEq    valsartan (DIOVAN) tablet 20 mg    senna-docusate (PERICOLACE) 8.6-50 mg per tablet 1 Tab    atorvastatin (LIPITOR) tablet 40 mg    guaiFENesin (ROBITUSSIN) 100 mg/5 mL oral liquid 100 mg    pantoprazole (PROTONIX) tablet 40 mg    sodium chloride (NS) flush 5-10 mL    sodium chloride (NS) flush 5-10 mL    aspirin chewable tablet 81 mg    fluticasone (FLONASE) 50 mcg/actuation nasal spray 2 Spray    fluticasone-vilanterol (BREO ELLIPTA) 100mcg-25mcg/puff    umeclidinium (INCRUSE ELLIPTA) 62.5 mcg/actuation    levothyroxine (SYNTHROID) tablet 50 mcg    levothyroxine (SYNTHROID) tablet 75 mcg    clopidogrel (PLAVIX) tablet 75 mg    metoprolol tartrate (LOPRESSOR) tablet 12.5 mg Labs:  No results for input(s): WBC, HGB, PLT, INR, APTT, HGBEXT, PLTEXT, HGBEXT, PLTEXT in the last 72 hours. No lab exists for component: FIB, DDMER, INREXT, INREXT  Recent Labs      01/10/17   0230  01/09/17   0205  01/08/17   0411   NA  140  145  146*   K  3.8  3.7  3.2*   CL  106  109*  109*   CO2  25  27  24   GLU  89  92  114*   BUN  7  10  12   CREA  0.58  0.72  0.65   CA  8.6  8.5  7.9*   MG  2.1  2.2  2.3   PHOS  3.0   --    --      No results for input(s): PH, PCO2, PO2, HCO3, FIO2 in the last 72 hours. No results for input(s): PH, PCO2, PO2, HCO3, FIO2 in the last 72 hours. No results for input(s): CPK, CKNDX, TROIQ in the last 72 hours. No lab exists for component: CPKMB    Imaging:  []                           I have personally reviewed the patients radiographs and reports:      No results found for this or any previous visit.     Chon Saeed MD

## 2017-01-10 NOTE — PROGRESS NOTES
Progress Note      1/10/2017 9:20 AM  NAME: Isi Rosario   MRN:  [id number]   Admit Diagnosis: NSTEMI (non-ST elevated myocardial infarction) [facility])      Problem List:      1. NSTEMI  2. Status post cath with rotational atherectomy and placement of 3 GURWINDER in the RCA  3. CAD s/p prior RCA stenting  4. Status post intubation for patient safety during cath -- now extubated on BiPAP prn and O2  5. Status post temporary pacer via RCFV -- removed 1/4/17  6. UGIB -- resolved, 2/2 stress  7. LLL aspiration/atelectasis  8. Fever  9. Severe COPD  10. Hyperlipidemia  11. Hypokalemia -- resolved  12. Hypophosphatemia -- resolved     Assessment/Plan:     1. Continue ASA and plavix  2. Continue atorvastatin 40mg  3. Continue metoprolol 12.5mg q12  4. Continue valsartan 20mg  5. Continue synthroid  6. Continue KCl 20mEq daily  7. Antibiotics, nebs, etc per pulmonary; appreciate assistance. Inpatient pulmonary rehab vs SNF   8. Inpatient pulmonary rehab consult -- pending acceptance at [facility]  9. Stop smoking  10. She can be discharged today if accepted         [x]       High complexity decision making was performed in this patient at high risk for decompensation with multiple organ involvement. Subjective:     Isi Rosario with is better; breathing difficult with talking, eating, and exertion. No CP  Discussed with RN events overnight. Review of Systems:    Symptom Y/N Comments  Symptom Y/N Comments   Fever/Chills N   Chest Pain N    Poor Appetite N   Edema N    Cough N   Abdominal Pain N    Sputum N   Joint Pain N    SOB/FERREIRA N   Pruritis/Rash N    Nausea/vomit N   Tolerating PT/OT Y    Diarrhea N   Tolerating Diet Y    Constipation N   Other       Could NOT obtain due to:      Objective:      Physical Exam:    Last 24hrs VS reviewed since prior progress note.  Most recent are:    Visit Vitals    /74 (BP 1 Location: Left arm, BP Patient Position: At rest)    Pulse 70    Temp 97.9 °F (36.6 °C)  Resp 18    Ht 4' 11\" (1.499 m)    Wt 30 kg (66 lb 2.2 oz)    LMP  (Exact Date)    SpO2 98%    BMI 13.36 kg/m2       Intake/Output Summary (Last 24 hours) at 01/10/17 0829  Last data filed at 01/10/17 0315   Gross per 24 hour   Intake              600 ml   Output              200 ml   Net              400 ml        General Appearance: Well developed, well nourished, alert, no acute distress. Ears/Nose/Mouth/Throat: Hearing grossly normal.  Neck: Supple. Chest: Generalized decreased air movement  Cardiovascular: Regular rate and rhythm, S1S2 normal, no murmur. Abdomen: Soft, non-tender, bowel sounds are active. Extremities: No edema bilaterally. Skin: Warm and dry. [x]         Post-cath site without hematoma, bruit, tenderness, or thrill. Distal pulses intact. PMH/SH reviewed - no change compared to H&P    Data Review    Telemetry: sinus rhythm    EKG:   []  No new EKG for review    Lab Data Personally Reviewed:    No results for input(s): WBC, HGB, HCT, PLT, HGBEXT, HCTEXT, PLTEXT, HGBEXT, HCTEXT, PLTEXT in the last 72 hours. No results for input(s): INR, PTP, APTT in the last 72 hours. No lab exists for component: INREXT, INREXT   Recent Labs      01/10/17   0230  01/09/17   0205  01/08/17   0411   NA  140  145  146*   K  3.8  3.7  3.2*   CL  106  109*  109*   CO2  25  27  24   BUN  7  10  12   CREA  0.58  0.72  0.65   GLU  89  92  114*   CA  8.6  8.5  7.9*   MG  2.1  2.2  2.3     No results for input(s): CPK, CKNDX, TROIQ in the last 72 hours. No lab exists for component: CPKMB  No results found for: CHOL, CHOLX, CHLST, CHOLV, HDL, LDL, DLDL, LDLC, DLDLP, TGL, TGLX, TRIGL, TRIGP, CHHD, CHHDX    No results for input(s): SGOT, GPT, AP, TBIL, TP, ALB, GLOB, GGT, AML, LPSE in the last 72 hours. No lab exists for component: AMYP, HLPSE  No results for input(s): PH, PCO2, PO2 in the last 72 hours.     Medications Personally Reviewed:    Current Facility-Administered Medications   Medication Dose Route Frequency    albuterol-ipratropium (DUO-NEB) 2.5 MG-0.5 MG/3 ML  3 mL Nebulization 02HWA RT    ALPRAZolam (XANAX) tablet 1 mg  1 mg Oral QID PRN    amoxicillin-clavulanate (AUGMENTIN) 500-125 mg per tablet 1 Tab  1 Tab Oral Q12H    potassium chloride SR (KLOR-CON 10) tablet 20 mEq  20 mEq Oral DAILY    valsartan (DIOVAN) tablet 20 mg  20 mg Oral DAILY    senna-docusate (PERICOLACE) 8.6-50 mg per tablet 1 Tab  1 Tab Oral DAILY PRN    atorvastatin (LIPITOR) tablet 40 mg  40 mg Oral QHS    guaiFENesin (ROBITUSSIN) 100 mg/5 mL oral liquid 100 mg  100 mg Oral QID    albuterol-ipratropium (DUO-NEB) 2.5 MG-0.5 MG/3 ML  3 mL Nebulization Q3H PRN    pantoprazole (PROTONIX) tablet 40 mg  40 mg Oral ACB    sodium chloride (NS) flush 5-10 mL  5-10 mL IntraVENous Q8H    sodium chloride (NS) flush 5-10 mL  5-10 mL IntraVENous PRN    aspirin chewable tablet 81 mg  81 mg Oral DAILY    fluticasone (FLONASE) 50 mcg/actuation nasal spray 2 Spray  2 Spray Both Nostrils DAILY    fluticasone-vilanterol (BREO ELLIPTA) 100mcg-25mcg/puff  1 Puff Inhalation DAILY    umeclidinium (INCRUSE ELLIPTA) 62.5 mcg/actuation  1 Puff Inhalation DAILY    levothyroxine (SYNTHROID) tablet 50 mcg  50 mcg Oral EVERY TUES,THUR,SAT,SUN    levothyroxine (SYNTHROID) tablet 75 mcg  75 mcg Oral Q MON, WED & FRI    clopidogrel (PLAVIX) tablet 75 mg  75 mg Oral DAILY    metoprolol tartrate (LOPRESSOR) tablet 12.5 mg  12.5 mg Oral Q12H         Parul Johnson MD

## 2017-01-10 NOTE — PROGRESS NOTES
Initial Nutrition Assessment:    INTERVENTIONS/RECOMMENDATIONS:   · Dental soft, cardiac diet  · Ensure TID  · Obtain new weight    ASSESSMENT:   Ms. Allison Floyd, 67 yo female present wit NSTEMI, CAD, COPD, HLD and PMH show below. Pt reports of poor appetite partially due to SOB when eating/preparing foods. Pt lives alone and her normal food habits are: breakfast toaster pastry, lunch: frozen waffles or breakfast sandwich, Dinner: meatloaf with varied sides. Pt reports she has always been thin, only exceeding ~80 lbs when she was pregnant. We discussed that her nutrition needs are increased due to increased energy expenditure from breathing and that eating 6 meals throughout the day may help meet her nutrition needs. She also mentioned having difficulty eating foods due to not having her bottom partial, she agreed to dental soft diet as well as ensure TID.      Past Medical History   Diagnosis Date    Adverse effect of anesthesia      difficult to wake x 1    Anxiety     Bipolar 1 disorder (HCC)     Chronic lung disease     Colon polyps 2001, 2006    Constipation     [facility] [address]     Diverticulitis     Emphysema 2013    Gonorrhea 26's    HSV infection     Hypercholesteremia     Hypothyroid     IBS (irritable bowel syndrome)      no formal diagnosis    Menarche      onset at age 12    Menopause      onset at age 45    MI (myocardial infarction) ([facility] [address] 08/2010    Osteopenia     S/P coronary artery stent placement 2010     Diet Order: Cardiac  % Eaten:  Patient Vitals for the past 72 hrs:   % Diet Eaten   01/09/17 1230 10 %   01/09/17 0930 25 %   01/08/17 1000 25 %   01/07/17 1500 50 %   01/07/17 1114 50 %     Pertinent Medications: [x]Reviewed []Other (synthroid, PPI, KCl, senna-docusate)  Pertinent Labs: [x]Reviewed []Other  Food Allergies: [x]None []Other   Last BM: 1/8   [x]Active     []Hyperactive  []Hypoactive       [] Absent BS  Skin:    [x] Intact   [] Incision  [] Breakdown  [] Other:    Anthropometrics:   Height: 4' 11\" (149.9 cm) Weight: 30 kg (66 lb 2.2 oz)   IBW (%IBW):   ( ) UBW (%UBW):   (  %)   Last Weight Metrics:  Weight Loss Metrics 1/3/2017 2016 2016 2016 10/4/2015 2013   Today's Wt 66 lb 2.2 oz 82 lb 4 oz 82 lb 83 lb 80 lb 86 lb 6 oz   BMI 13.36 kg/m2 17.19 kg/m2 17.14 kg/m2 17.35 kg/m2 16.72 kg/m2 18.06 kg/m2       BMI: Body mass index is 13.36 kg/(m^2). This BMI is indicative of:   []Underweight    []Normal    []Overweight    [] Obesity   [] Extreme Obesity (BMI>40)     Estimated Nutrition Needs (Based on):   1000 Kcals/day (MSJ: 725 x 1.4 d/t COPD) , 30 g (1 g/kg) Protein  Carbohydrate: At Least 130 g/day  Fluids: 1000 mL/day (1ml/kcal)    NUTRITION DIAGNOSES:   Problem:  Underweight      Etiology: related to hypermetabolic d/t COPD     Signs/Symptoms: as evidenced by BMI of 13      NUTRITION INTERVENTIONS:  Meals/Snacks: General/healthful diet   Supplements: Commercial supplement              GOAL:   consume >50% of meal and ONS in 2-4 days    LEARNING NEEDS (Diet, Food/Nutrient-Drug Interaction):    [x] None Identified   [] Identified and Education Provided/Documented   [] Identified and Pt declined/was not appropriate     Cultureal, Voodoo, OR Ethnic Dietary Needs:    [x] None Identified   [] Identified and Addressed     [x] Interdisciplinary Care Plan Reviewed/Documented    [x] Discharge Plannin small meals throughout the day, kcal dense foods     MONITORING /EVALUATION:      Food/Nutrient Intake Outcomes:  Total energy intake, Liquid meal replacement  Physical Signs/Symptoms Outcomes: Weight/weight change, Electrolyte and renal profile, GI    NUTRITION RISK:    [x] High       to       [x] Moderate           []  Low  []  Minimal/Uncompromised    PT SEEN FOR:    []  MD Consult: []Calorie Count      []Diabetic Diet Education        []Diet Education     []Electrolyte Management     []General Nutrition Management and Supplements     []Management of Tube Feeding     []TPN Recommendations    []  RN Referral:  []MST score >=2     []Enteral/Parenteral Nutrition PTA     []Pregnant: Gestational DM or Multigestation     []Pressure Ulcer/Wound Care needs        []  Low BMI  [x]  DTR Referral       Ras Lara RDN  Pager [phone]  Weekend Pager [phone]

## 2017-01-10 NOTE — PROGRESS NOTES
[address] SHIFT NURSING NOTE    Bedside shift change report given to Cindy Gibson (oncoming nurse) by Enmanuel Montes De Oca (offgoing nurse). Report included the following information SBAR, Kardex, Intake/Output, MAR and Recent Results. SHIFT SUMMARY:         Admission Date 1/3/2017   Admission Diagnosis NSTEMI (non-ST elevated myocardial infarction) ([facility] [address]   Consults IP CONSULT TO CARDIOLOGY  IP CONSULT TO PULMONOLOGY        Consults   [x] PT   [x] OT   [] Speech   [] Palliative      [] Hospice    [x] Case Management   [] None   Cardiac Monitoring   [x] Yes   [] No     Antibiotics   [x] Yes   [] No   GI Prophylaxis  (Ex: Protonix, Pepcid, etc,.)   [] Yes   [x] No          DVT Prophylaxis   SCDs:             Eder stockings:         [] Medication (Ex: Lovenox, Eliquis,  Heparin, etc..)   [] Contraindicated   [x] None       Urinary Catheter [REMOVED] Urinary Catheter 01/03/17 Box-Criteria for Appropriate Use: Obstruction/retention     [REMOVED] Urinary Catheter 01/03/17 Box-Urine Output (mL): 40 ml     LDAs               Peripheral IV 01/07/17 Left Forearm (Active)   Site Assessment Clean, dry, & intact 1/9/2017  7:50 PM   Phlebitis Assessment 0 1/9/2017  7:50 PM   Infiltration Assessment 0 1/9/2017  7:50 PM   Dressing Status Clean, dry, & intact 1/9/2017  7:50 PM   Dressing Type Transparent 1/9/2017  7:50 PM   Hub Color/Line Status Pink;Capped 1/9/2017  7:50 PM                      I/Os   Intake/Output Summary (Last 24 hours) at 01/09/17 1952  Last data filed at 01/09/17 1950   Gross per 24 hour   Intake              720 ml   Output              200 ml   Net              520 ml         Activity Level Activity Level: Up with Assistance     Activity Assistance: Partial (one person)   Diet Active Orders   Diet    DIET CARDIAC Regular      Purposeful Rounding every 1-2 hour?    [x] Yes    Germain Score  Total Score: 4   Bed Alarm (If score 3 or >)   [x] Yes    [] Refused (See signed refusal form in chart)   Mohsen Score  Mohsen Score: 19 Mohsen Score (if score 14 or less)   [] PMT consult   [] Nutrition consult   [] Wound Care consult      []  Specialty bed         Influenza Vaccine Received Flu Vaccine for Current Season (usually Sept-March): No    Patient/Guardian Refused (Notify MD): Yes          Needs prior to discharge:   Home O2 required:    [] Yes   [x] No     If yes, how much O2 required?     Other:    Last Bowel Movement Date: 01/08/17        POST-OP SURGICAL VATS   [] Yes   [x] No     Incentive Spirometer:   [] Yes   [] Refused   Coughing and deep breathing:     [] Yes   [] Refused   Oral care:     [] Yes   [] Refused   Understanding (patient education):     [] Yes   [] Refused   Getting out of bed Number times ambulated in hallway past shift:    Number of times OOB to chair past shift:     Head of bed elevation:     [] Yes   [] Refused      Readmission Risk Assessment Tool Score Medium Risk            19       Total Score        3 Relationship with PCP    3 Patient Length of Stay > 5    4 More than 1 Admission in calendar year    5 Patient Insurance is Medicare, Medicaid or Self Pay    4 Charlson Comorbidity Score        Criteria that do not apply:    Patient Living Status       Expected Length of Stay 2d 7h   Actual Length of Stay 6

## 2017-01-11 NOTE — PROGRESS NOTES
36 Dr. Shayy White paged as patient has been ringing out all morning, stating \"I can't seem to get this chest pain to go away. It feels like I'm having another heart attack but it's worse--it's coming down my left arm and into my jaw. \" VSS. NSR on monitor. RN & MD discussing as this may be anxiety. RN inquiring about repeat enzymes/EKG. New orders received for EKG. [address] with Dr. Shayy White. EKG has been completed. Patient has not had any nitroglycerin as she is sound asleep after receiving xanax. Will monitor. Jt 555 at length with patient's son. Patient's son updated on plan of care. Jennifer discussing potentially lowering dose of anti-anxiety medication with RN and OMAR. MD paged. Medication adjustments to be made. 65 Patient's family at bedside. Patient incredibly calm, much less anxious than earlier in the day. Patient confirming this but stating \"I don't want to take all that medication again. \" RN informing patient that dosage has been decreased to 1/4 of what she had earlier today.

## 2017-01-11 NOTE — PROGRESS NOTES
Physical Therapy:    Attempted to see patient for PT session at 10:45, however patient declined. Patient reported \"feeling terrible. \" Patient reported feeling short of breath and having pain. RN Shayan Hutchinson is aware of patient's reports. Will follow up time permitting and patient agreeability. Thanks. Attempted to see patient for a second time today and patient sleeping and difficult to arouse. Patient declined PT at this time. Will follow up at a later time. Thank you.      Pawan Lu, PT, DPT

## 2017-01-11 NOTE — PROGRESS NOTES
Progress Note      1/11/2017 9:20 AM  NAME: Selam Valenzuela   MRN:  [id number]   Admit Diagnosis: NSTEMI (non-ST elevated myocardial infarction) [facility])      Problem List:      1. NSTEMI  2. Status post cath with rotational atherectomy and placement of 3 GURWINDER in the RCA  3. CAD s/p prior RCA stenting  4. Status post intubation for patient safety during cath -- now extubated on BiPAP prn and O2  5. Status post temporary pacer via RCFV -- removed 1/4/17  6. UGIB -- resolved, 2/2 stress  7. LLL aspiration/atelectasis  8. Fever  9. Severe COPD  10. Hyperlipidemia  11. Hypokalemia -- resolved  12. Hypophosphatemia -- resolved     Assessment/Plan:     More SOB this morning after walking to the restroom; getting a neb currently. 1. Continue ASA and plavix  2. Continue atorvastatin 40mg  3. Continue metoprolol 12.5mg q12  4. Continue valsartan 20mg  5. Continue synthroid  6. Continue KCl 20mEq daily  7. Antibiotics, nebs, etc per pulmonary; appreciate assistance. 8. Inpatient pulmonary rehab consult -- pending acceptance at [facility]  9. Stop smoking  10. She can be discharged today if accepted  6. Dietary following         [x]       High complexity decision making was performed in this patient at high risk for decompensation with multiple organ involvement. Subjective:     Selam Valenzuela with is better; breathing difficult with talking, eating, and exertion. No CP  Discussed with RN events overnight. Review of Systems:    Symptom Y/N Comments  Symptom Y/N Comments   Fever/Chills N   Chest Pain N    Poor Appetite N   Edema N    Cough N   Abdominal Pain N    Sputum N   Joint Pain N    SOB/FERREIRA N   Pruritis/Rash N    Nausea/vomit N   Tolerating PT/OT Y    Diarrhea N   Tolerating Diet Y    Constipation N   Other       Could NOT obtain due to:      Objective:      Physical Exam:    Last 24hrs VS reviewed since prior progress note.  Most recent are:    Visit Vitals    /68    Pulse 82    Temp 97.9 °F (36.6 °C)    Resp 20    Ht 4' 11\" (1.499 m)    Wt 30 kg (66 lb 2.2 oz)    LMP  (Exact Date)    SpO2 91%    BMI 13.36 kg/m2       Intake/Output Summary (Last 24 hours) at 01/11/17 0837  Last data filed at 01/11/17 0330   Gross per 24 hour   Intake              720 ml   Output                0 ml   Net              720 ml        General Appearance: Well developed, well nourished, alert, no acute distress. Ears/Nose/Mouth/Throat: Hearing grossly normal.  Neck: Supple. Chest: Generalized decreased air movement  Cardiovascular: Regular rate and rhythm, S1S2 normal, no murmur. Abdomen: Soft, non-tender, bowel sounds are active. Extremities: No edema bilaterally. Skin: Warm and dry. [x]         Post-cath site without hematoma, bruit, tenderness, or thrill. Distal pulses intact. PMH/SH reviewed - no change compared to H&P    Data Review    Telemetry: sinus rhythm    EKG:   []  No new EKG for review    Lab Data Personally Reviewed:    No results for input(s): WBC, HGB, HCT, PLT, HGBEXT, HCTEXT, PLTEXT, HGBEXT, HCTEXT, PLTEXT in the last 72 hours. No results for input(s): INR, PTP, APTT in the last 72 hours. No lab exists for component: INREXT, INREXT   Recent Labs      01/11/17   0105  01/10/17   0230  01/09/17   0205   NA  140  140  145   K  3.9  3.8  3.7   CL  106  106  109*   CO2  26  25  27   BUN  10  7  10   CREA  0.69  0.58  0.72   GLU  105*  89  92   CA  8.9  8.6  8.5   MG  2.0  2.1  2.2     No results for input(s): CPK, CKNDX, TROIQ in the last 72 hours. No lab exists for component: CPKMB  No results found for: CHOL, CHOLX, CHLST, CHOLV, HDL, LDL, DLDL, LDLC, DLDLP, TGL, TGLX, TRIGL, TRIGP, CHHD, CHHDX    No results for input(s): SGOT, GPT, AP, TBIL, TP, ALB, GLOB, GGT, AML, LPSE in the last 72 hours. No lab exists for component: AMYP, HLPSE  No results for input(s): PH, PCO2, PO2 in the last 72 hours.     Medications Personally Reviewed:    Current Facility-Administered Medications Medication Dose Route Frequency    albuterol-ipratropium (DUO-NEB) 2.5 MG-0.5 MG/3 ML  3 mL Nebulization Q4H PRN    albuterol-ipratropium (DUO-NEB) 2.5 MG-0.5 MG/3 ML  3 mL Nebulization Q1H PRN    ALPRAZolam (XANAX) tablet 1 mg  1 mg Oral QID PRN    amoxicillin-clavulanate (AUGMENTIN) 500-125 mg per tablet 1 Tab  1 Tab Oral Q12H    potassium chloride SR (KLOR-CON 10) tablet 20 mEq  20 mEq Oral DAILY    valsartan (DIOVAN) tablet 20 mg  20 mg Oral DAILY    senna-docusate (PERICOLACE) 8.6-50 mg per tablet 1 Tab  1 Tab Oral DAILY PRN    atorvastatin (LIPITOR) tablet 40 mg  40 mg Oral QHS    guaiFENesin (ROBITUSSIN) 100 mg/5 mL oral liquid 100 mg  100 mg Oral QID    pantoprazole (PROTONIX) tablet 40 mg  40 mg Oral ACB    sodium chloride (NS) flush 5-10 mL  5-10 mL IntraVENous Q8H    sodium chloride (NS) flush 5-10 mL  5-10 mL IntraVENous PRN    aspirin chewable tablet 81 mg  81 mg Oral DAILY    fluticasone (FLONASE) 50 mcg/actuation nasal spray 2 Spray  2 Spray Both Nostrils DAILY    fluticasone-vilanterol (BREO ELLIPTA) 100mcg-25mcg/puff  1 Puff Inhalation DAILY    umeclidinium (INCRUSE ELLIPTA) 62.5 mcg/actuation  1 Puff Inhalation DAILY    levothyroxine (SYNTHROID) tablet 50 mcg  50 mcg Oral EVERY TUES,THUR,SAT,SUN    levothyroxine (SYNTHROID) tablet 75 mcg  75 mcg Oral Q MON, WED & FRI    clopidogrel (PLAVIX) tablet 75 mg  75 mg Oral DAILY    metoprolol tartrate (LOPRESSOR) tablet 12.5 mg  12.5 mg Oral Q12H         Nicole Pascual MD

## 2017-01-11 NOTE — PROGRESS NOTES
Occupational Therapy      Nursing cleared for patient who had received anti-anxiety med earlier. Patient sleeping, easily awake to voice. Patient provided education on  importance of therapy. Patient shaking head no. Will continue to follow.       Thank you,    Uzma Madera OTR/L

## 2017-01-11 NOTE — PROGRESS NOTES
[address] SHIFT NURSING NOTE    Bedside shift change report given to Mary Verdugo (oncoming nurse) by Suzi Rinne (offgoing nurse). Report included the following information SBAR, Kardex, Intake/Output, MAR and Recent Results. SHIFT SUMMARY:         Admission Date 1/3/2017   Admission Diagnosis NSTEMI (non-ST elevated myocardial infarction) ([facility] [address]   Consults IP CONSULT TO CARDIOLOGY  IP CONSULT TO PULMONOLOGY        Consults   [x] PT   [x] OT   [] Speech   [] Palliative      [] Hospice    [x] Case Management   [] None   Cardiac Monitoring   [x] Yes   [] No     Antibiotics   [x] Yes   [] No   GI Prophylaxis  (Ex: Protonix, Pepcid, etc,.)   [x] Yes   [] No          DVT Prophylaxis   SCDs:             Eder stockings:         [] Medication (Ex: Lovenox, Eliquis,  Heparin, etc..)   [] Contraindicated   [x] None       Urinary Catheter [REMOVED] Urinary Catheter 01/03/17 Box-Criteria for Appropriate Use: Obstruction/retention     [REMOVED] Urinary Catheter 01/03/17 Box-Urine Output (mL): 40 ml     LDAs               Peripheral IV 01/07/17 Left Forearm (Active)   Site Assessment Clean, dry, & intact 1/10/2017  7:41 PM   Phlebitis Assessment 0 1/10/2017  7:41 PM   Infiltration Assessment 0 1/10/2017  7:41 PM   Dressing Status Clean, dry, & intact 1/10/2017  7:41 PM   Dressing Type Transparent 1/10/2017  7:41 PM   Hub Color/Line Status Pink;Capped 1/10/2017  7:41 PM                      I/Os   Intake/Output Summary (Last 24 hours) at 01/10/17 1943  Last data filed at 01/10/17 1941   Gross per 24 hour   Intake              480 ml   Output                0 ml   Net              480 ml         Activity Level Activity Level: Up with Assistance     Activity Assistance: Partial (one person)   Diet Active Orders   Diet    DIET CARDIAC Soft Solids      Purposeful Rounding every 1-2 hour?    [x] Yes    Germain Score  Total Score: 4   Bed Alarm (If score 3 or >)   [x] Yes    [] Refused (See signed refusal form in chart)   Mohsen Score  Mohsen Score: 19       Mohsen Score (if score 14 or less)   [] PMT consult   [] Nutrition consult   [] Wound Care consult      []  Specialty bed         Influenza Vaccine Received Flu Vaccine for Current Season (usually Sept-March): No    Patient/Guardian Refused (Notify MD): Yes          Needs prior to discharge:   Home O2 required:    [] Yes   [x] No     If yes, how much O2 required?     Other:    Last Bowel Movement Date: 01/08/17        POST-OP SURGICAL VATS   [] Yes   [x] No     Incentive Spirometer:   [] Yes   [] Refused   Coughing and deep breathing:     [] Yes   [] Refused   Oral care:     [] Yes   [] Refused   Understanding (patient education):     [] Yes   [] Refused   Getting out of bed Number times ambulated in hallway past shift:    Number of times OOB to chair past shift:     Head of bed elevation:     [] Yes   [] Refused      Readmission Risk Assessment Tool Score Medium Risk            19       Total Score        3 Relationship with PCP    3 Patient Length of Stay > 5    4 More than 1 Admission in calendar year    5 Patient Insurance is Medicare, Medicaid or Self Pay    4 Charlson Comorbidity Score        Criteria that do not apply:    Patient Living Status       Expected Length of Stay 2d 7h   Actual Length of Stay 7

## 2017-01-11 NOTE — CARDIO/PULMONARY
CP REHAB NOTE    Chart Review: Patient admitted with NSTEMI  S/p PCI 1/3/2017  Medical History: COPD, hypercholesteremia, MI, prior PCI, bipolar  Current Every Day Smoker    Attempted to see patient on two separate occassions. Patient sleeping soundly in the bed. Will continue to follow.

## 2017-01-12 NOTE — PROGRESS NOTES
Spiritual Care Assessment/Progress Notes    Laine Topton [id number]  [id number]    1945  66 y.o.  female    Patient Telephone Number: [phone] (home)   Taoism Affiliation: Sabianism   Language: English   Extended Emergency Contact Information  Primary Emergency Contact: [address] [facility]  Mobile Phone: [phone]  Relation: Son  Secondary Emergency Contact: Shannon [facility], [address]  Phone: [phone]  Relation: Sister   Patient Active Problem List    Diagnosis Date Noted    NSTEMI (non-ST elevated myocardial infarction) ([facility] [address] 01/03/2017    LGSIL (low grade squamous intraepithelial dysplasia) 12/05/2013        Date: 1/12/2017       Level of Taoism/Spiritual Activity:  []         Involved in randell tradition/spiritual practice    [x]         Not involved in randell tradition/spiritual practice  [x]         Spiritually oriented    []         Claims no spiritual orientation    []         seeking spiritual identity  []         Feels alienated from Yazidi practice/tradition  []         Feels angry about Yazidi practice/tradition  [x]         Spirituality/Yazidi tradition does not seem to be a resource for coping at this time.   []         Not able to assess due to medical condition    Services Provided Today:  []         crisis intervention    []         reading Scriptures  [x]         spiritual assessment    [x]         prayer  [x]         empathic listening/emotional support  []         rites and rituals (cite in comments)  []         life review     []         Yazidi support  []         theological development    []         advocacy  []         ethical dialog     []         blessing  []         bereavement support    [x]         support to family  []         anticipatory grief support   []         help with AMD  []         spiritual guidance    []         meditation      Spiritual Care Needs  [x] Emotional Support  []         Spiritual/Baptism Care  [x]         Loss/Adjustment  []         Advocacy/Referral                /Ethics  []         No needs expressed at               this time  []         Other: (note in               comments)  Spiritual Care Plan  []         Follow up visits with               pt/family  []         Provide materials  []         Schedule sacraments  []         Contact Community               Clergy  [x]         Follow up as needed  []         Other: (note in               comments)     Comments:  Responded to Code Blue on [address] unit. No family present. Patient was taken to the Cath lab and arrested again and could not be resuscitated. Met patient's son, Mari Hanley in CCU waiting area. Remained with him until his wife, Tanisha Trinidad, arrived. Accompanied them to patient's room on [address] providing ministry of presence and emotional support. Stephani Young bereavement brochure as well as list of  homes from [address] shared patient was raised Orthodoxy and remains spiritual but not particularly Sabianist. He and his wife were receptive to assurance of prayer. Provided them with Nursing Office number to call when they have made a decision about a  home.   RAE Rodney, [facility], [address]     Paging Service  [phone])

## 2017-01-12 NOTE — PROGRESS NOTES
Patient developed chest pain , SOB and anxiousness. Thought to be similar to event yesterday. Given pain med and anxiolytic. Chest pain persisted and EKG done. Dr Chandrakant Ramos was called, repeat EKG done and showed Inferior ST elevation   C/w acute MI, likely acute occlusion of RCA. Heparin given . Morphine given. Cath lab called for acute intervention   On my arrival to patient bedside, pt in distress, chest pain , tachypnea. In preparation to cath lab, she became unresponsive. Bradycardia. PEA. Code Blue called . ACLS protocol initiated. Intubated by anesthesia. Resuscitation efforts for 50 minutes ( see code record for details)   No ROSC. Transported to cath lab with CPR in progress. Unable to stabilize enough to even start a cath on her. Resuscitation efforts stopped and patient transported back to room       Pronounced 7:20 am    Son counseled in CCU waiting room.

## 2017-01-12 NOTE — ROUTINE PROCESS
Quality: Chart reviewed for death and restraints. Restraints (bilateral soft wrist and right soft ankle) noted 1/3/17-01/4/17. Restraints not a contributing factor in patient death. Documented for tracking according to CMS guidelines at  12:06 on 1/12/17.

## 2017-01-12 NOTE — PROGRESS NOTES
[facility] SHIFT NURSING NOTE    Bedside shift change report given to (oncoming nurse) by Naga Pierre (offgoing nurse). Report included the following information SBAR, Kardex, Intake/Output, MAR and Recent Results. SHIFT SUMMARY: 7835- Patient anxious. PRN xanax administered. 0500-  Patient rates chest pain 9/10, substernal and radiating to both arms. EKG obtained and 1 SL nitro administered with no relief. VSS. Notified                                             Dr. Sanford Palmer. New orders received. Will monitor. 0515- ST elevation noted on monitor. A second EKG obtained. Dr. Sanford Palmer notified and rapid response intiated. 0530- Dr. Tami Kam and RRT at bedside. EKG reviewed by Dr. Tami Kam. Orders received. Cath lab notified. 0600- Dr. Sanford Palmer at bedside. 8009- Code blue called.         Admission Date 1/3/2017   Admission Diagnosis NSTEMI (non-ST elevated myocardial infarction) ([facility] [address]   Consults IP CONSULT TO CARDIOLOGY  IP CONSULT TO PULMONOLOGY        Consults   [x] PT   [x] OT   [] Speech   [] Palliative      [] Hospice    [x] Case Management   [] None   Cardiac Monitoring   [x] Yes   [] No     Antibiotics   [x] Yes   [] No   GI Prophylaxis  (Ex: Protonix, Pepcid, etc,.)   [] Yes   [x] No          DVT Prophylaxis   SCDs:             Eder stockings:         [] Medication (Ex: Lovenox, Eliquis,  Heparin, etc..)   [] Contraindicated   [x] None       Urinary Catheter [REMOVED] Urinary Catheter 01/03/17 Box-Criteria for Appropriate Use: Obstruction/retention     [REMOVED] Urinary Catheter 01/03/17 Box-Urine Output (mL): 40 ml     LDAs               Peripheral IV 01/07/17 Left Forearm (Active)   Site Assessment Clean, dry, & intact 1/11/2017  7:34 PM   Phlebitis Assessment 0 1/11/2017  7:34 PM   Infiltration Assessment 0 1/11/2017  7:34 PM   Dressing Status Clean, dry, & intact 1/11/2017  7:34 PM   Dressing Type Transparent 1/11/2017  7:34 PM   Hub Color/Line Status Pink;Capped 1/11/2017  7:34 PM                      I/Os   Intake/Output Summary (Last 24 hours) at 01/11/17 1936  Last data filed at 01/11/17 1934   Gross per 24 hour   Intake              720 ml   Output                0 ml   Net              720 ml         Activity Level Activity Level: Up with Assistance     Activity Assistance: Partial (one person)   Diet Active Orders   Diet    DIET CARDIAC Soft Solids      Purposeful Rounding every 1-2 hour? [x] Yes    Germain Score  Total Score: 4   Bed Alarm (If score 3 or >)   [x] Yes    [] Refused (See signed refusal form in chart)   Mohsen Score  Mohsen Score: 19       Mohsen Score (if score 14 or less)   [] PMT consult   [] Nutrition consult   [] Wound Care consult      []  Specialty bed         Influenza Vaccine Received Flu Vaccine for Current Season (usually Sept-March): No    Patient/Guardian Refused (Notify MD): Yes          Needs prior to discharge:   Home O2 required:    [] Yes   [x] No     If yes, how much O2 required?      Other:    Last Bowel Movement Date: 01/08/17        POST-OP SURGICAL VATS   [] Yes   [x] No     Incentive Spirometer:   [] Yes   [] Refused   Coughing and deep breathing:     [] Yes   [] Refused   Oral care:     [] Yes   [] Refused   Understanding (patient education):     [] Yes   [] Refused   Getting out of bed Number times ambulated in hallway past shift:    Number of times OOB to chair past shift:     Head of bed elevation:     [] Yes   [] Refused      Readmission Risk Assessment Tool Score Medium Risk            19       Total Score        3 Relationship with PCP    3 Patient Length of Stay > 5    4 More than 1 Admission in calendar year    5 Patient Insurance is Medicare, Medicaid or Self Pay    4 Charlson Comorbidity Score        Criteria that do not apply:    Patient Living Status       Expected Length of Stay 2d 7h   Actual Length of Stay 8

## 2017-01-12 NOTE — PROGRESS NOTES
Chest pained of by patient. EKG done, Trop, Cpk, CBC drawn. Dr Adrien Posey in [address]  Talking to Dr Newton Brown at this time. Ordered heparin bolus and Dr Newton Brown in at this way at this time.

## 2017-01-12 NOTE — PROGRESS NOTES
Hospitalist    RAT call for CP  Reviewed EKG and texted images to Dr Ml Baum, concern for ST elevation in inferior leads  He wanted a 5000 unit bolus heparin, PRN morphine  He will be in to see pt shortly, keep NPO in case cath needed    Tripp Turner MD

## 2017-01-13 PROBLEM — I46.9 PEA (PULSELESS ELECTRICAL ACTIVITY) (HCC): Status: ACTIVE | Noted: 2017-01-13

## 2017-01-13 PROBLEM — Z95.5 S/P RIGHT CORONARY ARTERY (RCA) STENT PLACEMENT: Status: ACTIVE | Noted: 2017-01-13

## 2017-01-13 PROBLEM — J44.9 COPD (CHRONIC OBSTRUCTIVE PULMONARY DISEASE) (HCC): Status: ACTIVE | Noted: 2017-01-13

## 2017-01-18 NOTE — DISCHARGE SUMMARY
Cardiology Discharge Summary     Patient ID: Lena Shetty  [id number]  21 y.o.  1945    Admit Date: 1/3/2017  Discharge Date: 1/18/2017   Admitting Physician: Gina Franco MD   Discharge Physician: Anju Rivera MD    Admission Diagnoses: NSTEMI (non-ST elevated myocardial infarction) [facility])    Discharge Diagnoses: Principal Problem:    NSTEMI (non-ST elevated myocardial infarction) ([facility] (1/3/2017)    Active Problems:    COPD (chronic obstructive pulmonary disease) ([facility] (1/13/2017)      S/P right coronary artery (RCA) stent placement (1/13/2017)      PEA (Pulseless electrical activity) ([facility] (1/13/2017)        Cardiology Procedures this Admission:  Left heart catheterization with PCI  EchoCardiogram  Temporary pacemaker, Rotational atherectomy    Hospital Course:    64yo WF admitted with an NSTEMI and CP in transfer from on OSH. She was taken to the cath lab on 1/3/17 for a diagnostic LHC which revealed a severely calcified RCA. During the PCI she became quite delirious and was a danger to herself and staff, thus she was intubated for her own safety. The PCI continued with successful rotation atherectomy and the successful placement of three GURWINDER in the RCA with VINOD 3 flow present in the runoff vessels at the conclusion of the procedure. A temporary pacemaker was placed via the RCFV as backup during the procedure. Please see the cath report for full details. She was taken to the ICU where her ventilator was weaned to off and she was extubated on 1/5/17. The pacemaker was removed on 1/5/17. She was treated for known severe COPD with the assistance of pulmonary. She had profound hypokalemia and hypophosphatemia which were repleted. She had left lower lobe atelectasis vs aspiration for which she was on antibiotics. Her pulmonary and cardiac meds were titrated. She was doing well and transferred to the floor. She worked with PT, OT, case management, and nutrition.   She was referred for inpatient pulmonary rehabilitation and was accepted for transfer on 17. At my visit on 17 she was not feeling well but had been up walking to the bathroom during which she was short of breath, as she had been for the duration of her admission. She says she didn't feel right. An EKG was done which showed sinus rhythm and no concerning findings of ischemia. She was treated for anxiety and she felt better by the afternoon. She requested not to be transferred until the following day. Early on 17 she developed some chest pain, dyspnea, and anxiousness. She was treated for anxiety but the chest pain persisted. The initial EKG was similar to the prior day, however, a repeat EKG showed concern for ST elevation in the inferior leads. Dr. Tyler Schulte was called (on call interventional cardiologist) and she was given morphine and heparin; the cath lab team was called in. On his arrival to the hospital Mrs. Nieves remained in distress. As she was being prepared to go to the cath lab she became unresponsive, bradycardic, and became pulseless. A code blue was called. ACLS protocol, intubation by anesthesia, and resuscitative measures ensued for approximately 50 minutes. Please see the code record for details. She was transported to the cath lab with CPR in progress. She was unable to be stabilized before starting the cath. She was pronounced dead at 0. Her son was made aware and counseled that morning by myself and Dr. Tyler Schulte. Consults: Pulmonary/Intensive care, Nephrology and Hospitalist  Disposition:   Discharge Condition:   Patient Instructions:      Follow-up:   Follow-up Information     None            > 30 minutes coordinating discharge  Yes     Signed:  Manolo Ricci MD  2017  7:42 AM

## 2022-04-07 NOTE — PROCEDURES
Intubation Note    Called to bedside secondary to  Code Blue. Start:  5343  End:   6727    Patient pre-oxygenated with 100% oxygen. DVL x 1    7.5 ETT taped and secured at 21 cm at the teeth.    + Bilateral BS, + Chest rise, + ETCO2    CXR pending.     Care turned over to covering Attending MD. Ten days of Augmentin for bronchitis.